# Patient Record
Sex: FEMALE | Race: WHITE | NOT HISPANIC OR LATINO | Employment: FULL TIME | URBAN - METROPOLITAN AREA
[De-identification: names, ages, dates, MRNs, and addresses within clinical notes are randomized per-mention and may not be internally consistent; named-entity substitution may affect disease eponyms.]

---

## 2019-04-13 ENCOUNTER — HOSPITAL ENCOUNTER (EMERGENCY)
Facility: HOSPITAL | Age: 44
Discharge: HOME | End: 2019-04-13
Attending: EMERGENCY MEDICINE | Admitting: EMERGENCY MEDICINE
Payer: COMMERCIAL

## 2019-04-13 ENCOUNTER — APPOINTMENT (EMERGENCY)
Dept: ULTRASOUND IMAGING | Facility: HOSPITAL | Age: 44
End: 2019-04-13
Payer: COMMERCIAL

## 2019-04-13 VITALS
RESPIRATION RATE: 18 BRPM | HEART RATE: 71 BPM | WEIGHT: 231.48 LBS | SYSTOLIC BLOOD PRESSURE: 120 MMHG | BODY MASS INDEX: 35.72 KG/M2 | OXYGEN SATURATION: 100 % | TEMPERATURE: 98.9 F | DIASTOLIC BLOOD PRESSURE: 70 MMHG

## 2019-04-13 DIAGNOSIS — I82.441 ACUTE DEEP VEIN THROMBOSIS (DVT) OF RIGHT TIBIAL VEIN (HCC): Primary | ICD-10-CM

## 2019-04-13 PROCEDURE — 93970 EXTREMITY STUDY: CPT

## 2019-04-13 PROCEDURE — 99283 EMERGENCY DEPT VISIT LOW MDM: CPT

## 2019-04-13 PROCEDURE — 99284 EMERGENCY DEPT VISIT MOD MDM: CPT | Performed by: EMERGENCY MEDICINE

## 2019-04-13 RX ADMIN — APIXABAN 10 MG: 5 TABLET, FILM COATED ORAL at 15:32

## 2019-04-14 PROCEDURE — 93970 EXTREMITY STUDY: CPT | Performed by: SURGERY

## 2019-04-21 ENCOUNTER — HOSPITAL ENCOUNTER (EMERGENCY)
Facility: HOSPITAL | Age: 44
Discharge: HOME | End: 2019-04-22
Attending: EMERGENCY MEDICINE
Payer: COMMERCIAL

## 2019-04-21 DIAGNOSIS — R06.00 DYSPNEA: Primary | ICD-10-CM

## 2019-04-21 PROCEDURE — 99284 EMERGENCY DEPT VISIT MOD MDM: CPT | Performed by: EMERGENCY MEDICINE

## 2019-04-21 PROCEDURE — 99285 EMERGENCY DEPT VISIT HI MDM: CPT

## 2019-04-22 ENCOUNTER — APPOINTMENT (EMERGENCY)
Dept: CT IMAGING | Facility: HOSPITAL | Age: 44
End: 2019-04-22
Payer: COMMERCIAL

## 2019-04-22 VITALS
HEART RATE: 76 BPM | WEIGHT: 232.81 LBS | SYSTOLIC BLOOD PRESSURE: 128 MMHG | DIASTOLIC BLOOD PRESSURE: 69 MMHG | TEMPERATURE: 98.1 F | RESPIRATION RATE: 18 BRPM | BODY MASS INDEX: 35.92 KG/M2 | OXYGEN SATURATION: 96 %

## 2019-04-22 LAB
ALBUMIN SERPL BCP-MCNC: 3.8 G/DL (ref 3.5–5)
ALP SERPL-CCNC: 47 U/L (ref 46–116)
ALT SERPL W P-5'-P-CCNC: 18 U/L (ref 12–78)
ANION GAP SERPL CALCULATED.3IONS-SCNC: 11 MMOL/L (ref 4–13)
APTT PPP: 31 SECONDS (ref 26–38)
AST SERPL W P-5'-P-CCNC: 14 U/L (ref 5–45)
ATRIAL RATE: 79 BPM
BASOPHILS # BLD AUTO: 0.04 THOUSANDS/ΜL (ref 0–0.1)
BASOPHILS NFR BLD AUTO: 1 % (ref 0–1)
BILIRUB SERPL-MCNC: 0.2 MG/DL (ref 0.2–1)
BUN SERPL-MCNC: 14 MG/DL (ref 5–25)
CALCIUM SERPL-MCNC: 8.8 MG/DL (ref 8.3–10.1)
CHLORIDE SERPL-SCNC: 101 MMOL/L (ref 100–108)
CO2 SERPL-SCNC: 25 MMOL/L (ref 21–32)
CREAT SERPL-MCNC: 0.96 MG/DL (ref 0.6–1.3)
EOSINOPHIL # BLD AUTO: 0.22 THOUSAND/ΜL (ref 0–0.61)
EOSINOPHIL NFR BLD AUTO: 4 % (ref 0–6)
ERYTHROCYTE [DISTWIDTH] IN BLOOD BY AUTOMATED COUNT: 14.2 % (ref 11.6–15.1)
GFR SERPL CREATININE-BSD FRML MDRD: 73 ML/MIN/1.73SQ M
GLUCOSE SERPL-MCNC: 143 MG/DL (ref 65–140)
HCT VFR BLD AUTO: 35.9 % (ref 34.8–46.1)
HGB BLD-MCNC: 11.6 G/DL (ref 11.5–15.4)
IMM GRANULOCYTES # BLD AUTO: 0.01 THOUSAND/UL (ref 0–0.2)
IMM GRANULOCYTES NFR BLD AUTO: 0 % (ref 0–2)
INR PPP: 1.07 (ref 0.86–1.17)
LYMPHOCYTES # BLD AUTO: 2.29 THOUSANDS/ΜL (ref 0.6–4.47)
LYMPHOCYTES NFR BLD AUTO: 38 % (ref 14–44)
MCH RBC QN AUTO: 26.2 PG (ref 26.8–34.3)
MCHC RBC AUTO-ENTMCNC: 32.3 G/DL (ref 31.4–37.4)
MCV RBC AUTO: 81 FL (ref 82–98)
MONOCYTES # BLD AUTO: 0.43 THOUSAND/ΜL (ref 0.17–1.22)
MONOCYTES NFR BLD AUTO: 7 % (ref 4–12)
NEUTROPHILS # BLD AUTO: 3.08 THOUSANDS/ΜL (ref 1.85–7.62)
NEUTS SEG NFR BLD AUTO: 50 % (ref 43–75)
NRBC BLD AUTO-RTO: 0 /100 WBCS
P AXIS: 46 DEGREES
PLATELET # BLD AUTO: 223 THOUSANDS/UL (ref 149–390)
PMV BLD AUTO: 9.2 FL (ref 8.9–12.7)
POTASSIUM SERPL-SCNC: 3.4 MMOL/L (ref 3.5–5.3)
PR INTERVAL: 128 MS
PROT SERPL-MCNC: 7.4 G/DL (ref 6.4–8.2)
PROTHROMBIN TIME: 13.6 SECONDS (ref 11.8–14.2)
QRS AXIS: -28 DEGREES
QRSD INTERVAL: 98 MS
QT INTERVAL: 360 MS
QTC INTERVAL: 412 MS
RBC # BLD AUTO: 4.43 MILLION/UL (ref 3.81–5.12)
SODIUM SERPL-SCNC: 137 MMOL/L (ref 136–145)
T WAVE AXIS: 50 DEGREES
TROPONIN I SERPL-MCNC: <0.02 NG/ML
VENTRICULAR RATE: 79 BPM
WBC # BLD AUTO: 6.07 THOUSAND/UL (ref 4.31–10.16)

## 2019-04-22 PROCEDURE — 36415 COLL VENOUS BLD VENIPUNCTURE: CPT | Performed by: EMERGENCY MEDICINE

## 2019-04-22 PROCEDURE — 85730 THROMBOPLASTIN TIME PARTIAL: CPT | Performed by: EMERGENCY MEDICINE

## 2019-04-22 PROCEDURE — 71275 CT ANGIOGRAPHY CHEST: CPT

## 2019-04-22 PROCEDURE — 84484 ASSAY OF TROPONIN QUANT: CPT | Performed by: EMERGENCY MEDICINE

## 2019-04-22 PROCEDURE — 85025 COMPLETE CBC W/AUTO DIFF WBC: CPT | Performed by: EMERGENCY MEDICINE

## 2019-04-22 PROCEDURE — 93010 ELECTROCARDIOGRAM REPORT: CPT | Performed by: INTERNAL MEDICINE

## 2019-04-22 PROCEDURE — 93005 ELECTROCARDIOGRAM TRACING: CPT

## 2019-04-22 PROCEDURE — 80053 COMPREHEN METABOLIC PANEL: CPT | Performed by: EMERGENCY MEDICINE

## 2019-04-22 PROCEDURE — 85610 PROTHROMBIN TIME: CPT | Performed by: EMERGENCY MEDICINE

## 2019-04-22 RX ADMIN — IOHEXOL 85 ML: 350 INJECTION, SOLUTION INTRAVENOUS at 01:03

## 2019-05-29 ENCOUNTER — CONSULT (OUTPATIENT)
Dept: HEMATOLOGY ONCOLOGY | Facility: CLINIC | Age: 44
End: 2019-05-29
Payer: COMMERCIAL

## 2019-05-29 VITALS
TEMPERATURE: 98.4 F | HEART RATE: 74 BPM | DIASTOLIC BLOOD PRESSURE: 88 MMHG | WEIGHT: 224 LBS | BODY MASS INDEX: 34.57 KG/M2 | SYSTOLIC BLOOD PRESSURE: 144 MMHG | OXYGEN SATURATION: 99 % | RESPIRATION RATE: 18 BRPM

## 2019-05-29 DIAGNOSIS — I82.441 ACUTE DEEP VEIN THROMBOSIS (DVT) OF RIGHT TIBIAL VEIN (HCC): ICD-10-CM

## 2019-05-29 DIAGNOSIS — I82.441 ACUTE DEEP VEIN THROMBOSIS (DVT) OF TIBIAL VEIN OF RIGHT LOWER EXTREMITY (HCC): Primary | ICD-10-CM

## 2019-05-29 DIAGNOSIS — Z86.718 HISTORY OF DVT (DEEP VEIN THROMBOSIS): ICD-10-CM

## 2019-05-29 PROCEDURE — 99243 OFF/OP CNSLTJ NEW/EST LOW 30: CPT | Performed by: INTERNAL MEDICINE

## 2019-07-06 ENCOUNTER — APPOINTMENT (OUTPATIENT)
Dept: LAB | Age: 44
End: 2019-07-06
Payer: COMMERCIAL

## 2019-07-06 DIAGNOSIS — I82.441 ACUTE DEEP VEIN THROMBOSIS (DVT) OF TIBIAL VEIN OF RIGHT LOWER EXTREMITY (HCC): ICD-10-CM

## 2019-07-06 LAB
DEPRECATED AT III PPP: 90 % OF NORMAL (ref 92–136)
DEPRECATED D DIMER PPP: 518 NG/ML (FEU)

## 2019-07-06 PROCEDURE — 85379 FIBRIN DEGRADATION QUANT: CPT

## 2019-07-06 PROCEDURE — 85303 CLOT INHIBIT PROT C ACTIVITY: CPT

## 2019-07-06 PROCEDURE — 85670 THROMBIN TIME PLASMA: CPT

## 2019-07-06 PROCEDURE — 85732 THROMBOPLASTIN TIME PARTIAL: CPT

## 2019-07-06 PROCEDURE — 85300 ANTITHROMBIN III ACTIVITY: CPT

## 2019-07-06 PROCEDURE — 85306 CLOT INHIBIT PROT S FREE: CPT

## 2019-07-06 PROCEDURE — 36415 COLL VENOUS BLD VENIPUNCTURE: CPT

## 2019-07-06 PROCEDURE — 81240 F2 GENE: CPT

## 2019-07-06 PROCEDURE — 81241 F5 GENE: CPT

## 2019-07-06 PROCEDURE — 85613 RUSSELL VIPER VENOM DILUTED: CPT

## 2019-07-06 PROCEDURE — 86147 CARDIOLIPIN ANTIBODY EA IG: CPT

## 2019-07-06 PROCEDURE — 85705 THROMBOPLASTIN INHIBITION: CPT

## 2019-07-06 PROCEDURE — 86146 BETA-2 GLYCOPROTEIN ANTIBODY: CPT

## 2019-07-06 PROCEDURE — 85305 CLOT INHIBIT PROT S TOTAL: CPT

## 2019-07-08 LAB
CARDIOLIPIN IGA SER IA-ACNC: <9 APL U/ML (ref 0–11)
CARDIOLIPIN IGG SER IA-ACNC: <9 GPL U/ML (ref 0–14)
CARDIOLIPIN IGM SER IA-ACNC: <9 MPL U/ML (ref 0–12)
PROT C AG ACT/NOR PPP IA: 103 % OF NORMAL (ref 60–150)

## 2019-07-09 ENCOUNTER — HOSPITAL ENCOUNTER (OUTPATIENT)
Dept: NON INVASIVE DIAGNOSTICS | Facility: CLINIC | Age: 44
Discharge: HOME | End: 2019-07-09
Payer: COMMERCIAL

## 2019-07-09 DIAGNOSIS — I82.441 ACUTE DEEP VEIN THROMBOSIS (DVT) OF TIBIAL VEIN OF RIGHT LOWER EXTREMITY (HCC): ICD-10-CM

## 2019-07-09 LAB
B2 GLYCOPROT1 IGA SER-ACNC: <9 GPI IGA UNITS (ref 0–25)
B2 GLYCOPROT1 IGG SER-ACNC: <9 GPI IGG UNITS (ref 0–20)
B2 GLYCOPROT1 IGM SER-ACNC: <9 GPI IGM UNITS (ref 0–32)
F5 GENE MUT ANL BLD/T: NORMAL

## 2019-07-09 PROCEDURE — 93971 EXTREMITY STUDY: CPT | Performed by: SURGERY

## 2019-07-09 PROCEDURE — 93971 EXTREMITY STUDY: CPT

## 2019-07-10 ENCOUNTER — TELEPHONE (OUTPATIENT)
Dept: HEMATOLOGY ONCOLOGY | Facility: CLINIC | Age: 44
End: 2019-07-10

## 2019-07-10 LAB
APTT SCREEN TO CONFIRM RATIO: 1.1 RATIO (ref 0–1.4)
CONFIRM APTT/NORMAL: 39.2 SEC (ref 0–55)
LA PPP-IMP: NORMAL
PROT S ACT/NOR PPP: 63 % (ref 57–157)
PROT S ACT/NOR PPP: 64 % (ref 63–140)
PROT S PPP-ACNC: 76 % (ref 60–150)
SCREEN APTT: 32.8 SEC (ref 0–51.9)
SCREEN DRVVT: 36.9 SEC (ref 0–47)
THROMBIN TIME: 18.6 SEC (ref 0–23)

## 2019-07-10 NOTE — TELEPHONE ENCOUNTER
Left message for patient about change to her appt  Moved from [facility] to [place], [place]  Moved time from 9am to 9:30am on Tuesday 7/16

## 2019-07-12 LAB — F2 GENE MUT ANL BLD/T: NORMAL

## 2019-07-16 ENCOUNTER — OFFICE VISIT (OUTPATIENT)
Dept: HEMATOLOGY ONCOLOGY | Facility: CLINIC | Age: 44
End: 2019-07-16
Payer: COMMERCIAL

## 2019-07-16 VITALS
HEART RATE: 74 BPM | TEMPERATURE: 98.1 F | RESPIRATION RATE: 18 BRPM | BODY MASS INDEX: 33.8 KG/M2 | WEIGHT: 223 LBS | HEIGHT: 68 IN | DIASTOLIC BLOOD PRESSURE: 80 MMHG | OXYGEN SATURATION: 98 % | SYSTOLIC BLOOD PRESSURE: 135 MMHG

## 2019-07-16 DIAGNOSIS — Z79.01 ANTICOAGULANT LONG-TERM USE: ICD-10-CM

## 2019-07-16 DIAGNOSIS — O22.30 DVT (DEEP VEIN THROMBOSIS) IN PREGNANCY: ICD-10-CM

## 2019-07-16 DIAGNOSIS — I82.4Z1 ACUTE DEEP VEIN THROMBOSIS (DVT) OF DISTAL VEIN OF RIGHT LOWER EXTREMITY (HCC): Primary | ICD-10-CM

## 2019-07-16 PROBLEM — I82.401 DEEP VEIN THROMBOSIS (DVT) OF RIGHT LOWER EXTREMITY (HCC): Status: ACTIVE | Noted: 2019-07-16

## 2019-07-16 PROCEDURE — 99214 OFFICE O/P EST MOD 30 MIN: CPT | Performed by: PHYSICIAN ASSISTANT

## 2019-07-16 RX ORDER — ALBUTEROL SULFATE 90 UG/1
AEROSOL, METERED RESPIRATORY (INHALATION)
COMMUNITY
Start: 2019-02-27

## 2019-07-16 NOTE — PROGRESS NOTES
Hematology/Oncology Outpatient Follow- up Note  Maria Luisa Knight 38 y o  female MRN: @ Encounter: [id number]      Date:  7/16/2019    Presenting Complaint/Diagnosis :  DVT    HPI:  Enrrique Diaz is a pleasant 38year old  female, who presents to the office unaccompanied for today's visit  She has history of DVT 7 years ago, which was provoked by pregnancy  She had recent 2nd DVT in 4/19, which was unprovoked, and was placed on Anticoagulation  She denied excess travel, trauma, surgery, is a nonsmoker, had no history of malignancy, and no chronic inflammation or autoimmune disorder with no FH thrombosis  She noted prolonged drive, but that has been ongoing for 3+ years  She has been on Eliquis for 3 months, taking treatment dose 5 mg BID  She has tolerated it well, with noting slight increase in bleeding with cutting herself, but otherwise has no bleeding from any orifice  She has not noted increased bruising  Interval History:    Since initial consult with Dr Maximus Byrnes on 5/29/19, patient notes she is doing well  She notes no real side effects from Eliquis and no increase bruising  She notes if she nicks herself when shaving, it may take slightly longer to stop, but that is it  She denies bleeding from any orifice  She notes her menses is regular and has not increased or changed  She notes occasional palpitations, and that has been ongoing every 6 months or so and PCP aware  Denies CP/SOB, N/V/D/C  Test Results:  Imaging: Vas Lower Limb Venous Duplex Study, Unilateral/limited  Result Date: 7/9/2019  Narrative:  [facility]PORT [facility]INICAL: Indications: Patient presents to determine propagation vs resolution of previously noted DVT in the right posterior tibial veins from the mid to proximal calf discovered on 04/13/2019  Patient reports no complaints at this time  Operative History: 2019-07-09 No heart or vascular surgeries Risk Factors The patient has history of DVT, and on Eliquis  She has no history of Obesity  The patient current BMI is 35 08, Weight is 224 lb and height is 67 in  FINDINGS:  Right       Impression                           Valve   Reflux Time  CFV                                              Reflux      1 20000  Peroneal    E3  Occlusive Segmental (Chronic)                          PostTibial  E1  Non Occlusive Thrombus (Chronic)                        Left        Impression       CFV         Normal (Patent)     CONCLUSION:  Impression: RIGHT LOWER LIMB No evidence of acute deep vein thrombosis  Chronic non occlusive deep vein thrombosis in the posterior tibial veins at the proximal calf extending into the proximal calf  Subacute versus chronic non occlusive deep vein thrombosis in one peroneal vein from at the mid calf extending into the proximal calf  No evidence of superficial thrombophlebitis noted  Deep venous incompetence is noted  Popliteal, posterior tibial and anterior tibial arterial Doppler waveforms are triphasic  LEFT LOWER LIMB LIMITED Evaluation shows no evidence of thrombus in the common femoral vein  Doppler evaluation shows a normal response to augmentation maneuvers  In comparison to the study of 04/13/2019, there is partial resolution of the prior occlusive posterior tibial vein DVT  There is now subacute to chronic DVT noted in one peroneal vein  SIGNATURE: Electronically Signed by: Neo Luna on 2019-07-09 10:50:46 AM    Labs: MOST RECENT CBC & CMP  Lab Results   Component Value Date    WBC 6 07 04/22/2019    HGB 11 6 04/22/2019    HCT 35 9 04/22/2019    MCV 81 (L) 04/22/2019     04/22/2019   Stable      Lab Results   Component Value Date    K 3 4 (L) 04/22/2019     04/22/2019    CO2 25 04/22/2019    BUN 14 04/22/2019    CREATININE 0 96 04/22/2019    CALCIUM 8 8 04/22/2019    AST 14 04/22/2019    ALT 18 04/22/2019    ALKPHOS 47 04/22/2019    EGFR 73 04/22/2019     D-dimer mildly elevated at 518, antithrombin III mildly decreased at 90%    60% being clinical cutoff  Anticardiolipin WNL, factor V leiden (-)  Lupus anticoagulant also WNL, as is Protein C & S  Review of Systems   Constitutional: Negative for activity change, appetite change, fatigue, fever and unexpected weight change  HENT: Negative for congestion, nosebleeds, sore throat and trouble swallowing  Eyes: Negative for visual disturbance  Wears glasses  Respiratory: Negative for cough, chest tightness, shortness of breath and wheezing  Cardiovascular: Positive for palpitations (Occasional   Every 6 months or so )  Negative for chest pain and leg swelling  Gastrointestinal: Negative for abdominal distention, abdominal pain, blood in stool, constipation, diarrhea, nausea and vomiting  Genitourinary: Positive for vaginal bleeding (Menstrual cycle  Regular  No change cycle)  Negative for difficulty urinating, dysuria, hematuria and pelvic pain  Musculoskeletal: Negative for arthralgias, back pain and myalgias  Right calf still sore due to pressure from U/S  Improving  Skin: Negative for pallor and rash  Neurological: Negative for dizziness, weakness, numbness and headaches  Hematological: Negative for adenopathy  Does not bruise/bleed easily (No)  Psychiatric/Behavioral: Negative for confusion and sleep disturbance  The patient is not nervous/anxious        Active Problems:   Patient Active Problem List   Diagnosis    Family history of malignant neoplasm of breast    Diabetes mellitus affecting pregnancy, childbirth, or puerperium    Eczema    DVT [facility]deep vein thrombosis) in pregnancy ([facility]    Deep vein thrombosis (DVT) of right lower extremity (HCC)    Anticoagulant long-term use     Past Medical History:   Past Medical History:   Diagnosis Date    D[facility]deep venous thrombosis) ([facility]      Surgical History:   Past Surgical History:   Procedure Laterality Date     SECTION      OVARIAN CYST REMOVAL       Family History:  No family history on file  Cancer-related family history is not on file  Social History:   Social History     Socioeconomic History    Marital status: /Civil Union     Spouse name: Not on file    Number of children: Not on file    Years of education: Not on file    Highest education level: Not on file   Occupational History    Not on file   Social Needs    Financial resource strain: Not on file    Food insecurity:     Worry: Not on file     Inability: Not on file    Transportation needs:     Medical: Not on file     Non-medical: Not on file   Tobacco Use    Smoking status: Never Smoker    Smokeless tobacco: Never Used   Substance and Sexual Activity    Alcohol use: Not Currently    Drug use: Never    Sexual activity: Not on file   Lifestyle    Physical activity:     Days per week: Not on file     Minutes per session: Not on file    Stress: Not on file   Relationships    Social connections:     Talks on phone: Not on file     Gets together: Not on file     Attends [employer] service: Not on file     Active member of club or organization: Not on file     Attends meetings of clubs or organizations: Not on file     Relationship status: Not on file    Intimate partner violence:     Fear of current or ex partner: Not on file     Emotionally abused: Not on file     Physically abused: Not on file     Forced sexual activity: Not on file   Other Topics Concern    Not on file   Social History Narrative    Not on file     Current Medications:   Current Outpatient Medications   Medication Sig Dispense Refill    albuterol (PROAIR HFA) 90 mcg/act inhaler   RESCUE INHALER: 1-2inhalations every 4hrs as needed for cough, wheezing or shortness of breath      apixaban (ELIQUIS) 2 5 mg Take 1 tablet (2 5 mg total) by mouth 2 (two) times a day 180 tablet 3     No current facility-administered medications for this visit        Allergies: No Known Allergies    Physical Exam:  Physical Exam   Constitutional: She is oriented to person, place, and time  She appears well-developed and well-nourished  No distress  HENT:   Head: Normocephalic and atraumatic  Mouth/Throat: Oropharynx is clear and moist  No oropharyngeal exudate  Eyes: Pupils are equal, round, and reactive to light  Conjunctivae and EOM are normal  Right eye exhibits no discharge  Left eye exhibits no discharge  No scleral icterus  Positive glasses  Neck: Normal range of motion  Neck supple  No tracheal deviation present  No thyromegaly present  Cardiovascular: Normal rate, regular rhythm and normal heart sounds  Exam reveals no gallop and no friction rub  No murmur heard  Pulmonary/Chest: Effort normal and breath sounds normal  No respiratory distress  She has no wheezes  She has no rales  Abdominal: Soft  Bowel sounds are normal  She exhibits no distension and no mass  There is no hepatosplenomegaly  There is no tenderness  There is no rebound and no guarding  Genitourinary:   Genitourinary Comments: Deferred     Musculoskeletal: Normal range of motion  She exhibits no edema or tenderness  Lymphadenopathy:     She has no cervical adenopathy  Right: No inguinal and no supraclavicular adenopathy present  Left: No inguinal and no supraclavicular adenopathy present  Neurological: She is alert and oriented to person, place, and time  Skin: Skin is warm and dry  No rash noted  She is not diaphoretic  No erythema  No pallor  Psychiatric: She has a normal mood and affect  Her behavior is normal      Vitals:    07/16/19 1000   BP: 135/80   Pulse: 74   Resp: 18   Temp: 98 1 °F (36 7 °C)   SpO2: 98%   Stable  Assessment / Plan:    1  Acute deep vein thrombosis (DVT) of distal vein of right lower extremity ([address] )  2  DVT (deep vein thrombosis) in pregnancy ([address] )  3  Anticoagulant long-term use  DVT on anticoagulation currently with Eliquis 5 mg BID    Patient initially had DVT in pregnancy 7 years ago, and in 4/19, she developed another unprovoked DVT  She notes she tolerates med quite well  Dr Daniel Ingram was present and explained to patient that risk of clot is 1-2%, and risk of bleeding is 0 3%, tipping the scale more toward risk of clot formation  As risk 5-10% of developing another clot, as D dimer is mildly elevated, and Antithrombin III is mildly decreased, we will plan on staying on maintenance dose of Eliquis at 2 5 mg BID  We reviewed her Doppler, and this represents her new baseline  There was Right chronic nonocclusive DVT with Left OK  This may represent scar vs chronic clot, but unable to determine  We will plan on continuing with Eliquis for the next year  We will repeat D dimer in 1 year and based on result, we may switch to baby ASA daily  She was made aware to contact our office with any bleeding concerns, and if bad to report to ED  She is aware to avoid head trauma, and will proceed to ED with significant injury  We provided refill for new dose of Eliquis  - D-dimer, quantitative; Future  - apixaban (ELIQUIS) 2 5 mg; Take 1 tablet (2 5 mg total) by mouth 2 (two) times a day  Dispense: 180 tablet; Refill: 3      ECOG PS 0    Goals and Barriers:  Current Goal:  Prolong Survival from DVT  Barriers: None  Patient's Capacity to Self Care:  Patient is able to self care  Total time spent with patient was 30 minutes, of which >50% of the time was spent in direct consultation discussing DVT, management and reviewing results  Dr Daniel Ingram joined in room for visit  Portions of the record may have been created with voice recognition software   Occasional wrong word or "sound a like" substitutions may have occurred due to the inherent limitations of voice recognition software   Read the chart carefully and recognize, using context, where substitutions have occurred

## 2020-02-03 ENCOUNTER — TELEPHONE (OUTPATIENT)
Dept: INFUSION CENTER | Facility: HOSPITAL | Age: 45
End: 2020-02-03

## 2020-02-03 NOTE — TELEPHONE ENCOUNTER
Patient inquiring whether she can take glucosamine for joint pain while on eliquis  Glucosamine can thin the blood so patient advised not to take  Patient also has allergies and wants to take allegra -D, no interaction seen with eliquis

## 2020-05-22 ENCOUNTER — TELEPHONE (OUTPATIENT)
Dept: HEMATOLOGY ONCOLOGY | Facility: CLINIC | Age: 45
End: 2020-05-22

## 2020-06-17 ENCOUNTER — TELEPHONE (OUTPATIENT)
Dept: HEMATOLOGY ONCOLOGY | Facility: MEDICAL CENTER | Age: 45
End: 2020-06-17

## 2020-07-28 ENCOUNTER — TRANSCRIBE ORDERS (OUTPATIENT)
Dept: LAB | Facility: CLINIC | Age: 45
End: 2020-07-28

## 2020-07-28 ENCOUNTER — TELEPHONE (OUTPATIENT)
Dept: HEMATOLOGY ONCOLOGY | Facility: CLINIC | Age: 45
End: 2020-07-28

## 2020-07-28 DIAGNOSIS — Z79.01 ANTICOAGULANT LONG-TERM USE: Primary | ICD-10-CM

## 2020-07-28 NOTE — TELEPHONE ENCOUNTER
Patient has appt with Dr Monge Doctor on 8/4/20  She went to the lab yesterday, but no lab work was put in the system  Please add her labs so she can go tomorrow  Thank you

## 2020-07-29 ENCOUNTER — APPOINTMENT (OUTPATIENT)
Dept: LAB | Facility: CLINIC | Age: 45
End: 2020-07-29
Payer: COMMERCIAL

## 2020-07-29 DIAGNOSIS — Z79.01 ANTICOAGULANT LONG-TERM USE: ICD-10-CM

## 2020-07-29 LAB
BASOPHILS # BLD AUTO: 0.04 THOUSANDS/ΜL (ref 0–0.1)
BASOPHILS NFR BLD AUTO: 1 % (ref 0–1)
D DIMER PPP FEU-MCNC: 0.32 UG/ML FEU
EOSINOPHIL # BLD AUTO: 0.24 THOUSAND/ΜL (ref 0–0.61)
EOSINOPHIL NFR BLD AUTO: 4 % (ref 0–6)
ERYTHROCYTE [DISTWIDTH] IN BLOOD BY AUTOMATED COUNT: 13.8 % (ref 11.6–15.1)
HCT VFR BLD AUTO: 36.3 % (ref 34.8–46.1)
HGB BLD-MCNC: 11.4 G/DL (ref 11.5–15.4)
IMM GRANULOCYTES # BLD AUTO: 0.03 THOUSAND/UL (ref 0–0.2)
IMM GRANULOCYTES NFR BLD AUTO: 0 % (ref 0–2)
LYMPHOCYTES # BLD AUTO: 2.07 THOUSANDS/ΜL (ref 0.6–4.47)
LYMPHOCYTES NFR BLD AUTO: 30 % (ref 14–44)
MCH RBC QN AUTO: 26.4 PG (ref 26.8–34.3)
MCHC RBC AUTO-ENTMCNC: 31.4 G/DL (ref 31.4–37.4)
MCV RBC AUTO: 84 FL (ref 82–98)
MONOCYTES # BLD AUTO: 0.48 THOUSAND/ΜL (ref 0.17–1.22)
MONOCYTES NFR BLD AUTO: 7 % (ref 4–12)
NEUTROPHILS # BLD AUTO: 4.05 THOUSANDS/ΜL (ref 1.85–7.62)
NEUTS SEG NFR BLD AUTO: 58 % (ref 43–75)
NRBC BLD AUTO-RTO: 0 /100 WBCS
PLATELET # BLD AUTO: 183 THOUSANDS/UL (ref 149–390)
PMV BLD AUTO: 8.6 FL (ref 8.9–12.7)
RBC # BLD AUTO: 4.32 MILLION/UL (ref 3.81–5.12)
WBC # BLD AUTO: 6.91 THOUSAND/UL (ref 4.31–10.16)

## 2020-07-29 PROCEDURE — 85379 FIBRIN DEGRADATION QUANT: CPT

## 2020-07-29 PROCEDURE — 36415 COLL VENOUS BLD VENIPUNCTURE: CPT

## 2020-07-29 PROCEDURE — 85025 COMPLETE CBC W/AUTO DIFF WBC: CPT

## 2020-08-04 ENCOUNTER — OFFICE VISIT (OUTPATIENT)
Dept: HEMATOLOGY ONCOLOGY | Facility: CLINIC | Age: 45
End: 2020-08-04
Payer: COMMERCIAL

## 2020-08-04 VITALS
WEIGHT: 247.5 LBS | HEART RATE: 85 BPM | DIASTOLIC BLOOD PRESSURE: 88 MMHG | OXYGEN SATURATION: 98 % | BODY MASS INDEX: 37.63 KG/M2 | RESPIRATION RATE: 18 BRPM | SYSTOLIC BLOOD PRESSURE: 128 MMHG

## 2020-08-04 DIAGNOSIS — I82.4Z1 ACUTE DEEP VEIN THROMBOSIS (DVT) OF DISTAL VEIN OF RIGHT LOWER EXTREMITY (HCC): ICD-10-CM

## 2020-08-04 DIAGNOSIS — Z86.718 HISTORY OF DVT (DEEP VEIN THROMBOSIS): Primary | ICD-10-CM

## 2020-08-04 PROCEDURE — 99214 OFFICE O/P EST MOD 30 MIN: CPT | Performed by: PHYSICIAN ASSISTANT

## 2020-08-04 NOTE — PROGRESS NOTES
Medical Oncology/Hematology Outpatient Follow Up Note  Roda Eisenmenger, 41 y o , 1975      Date:  20    Primary Hematology/Oncology Provider: Dr Musa Thompson      Presenting Diagnosis/Chief Complaint:   Chief Complaint   Patient presents with    Follow-up     DVT       HPI/Oncologic/Hematologic History:  Oncology History    No history exists  History of DVT  provoked by preganancy  Second DVT 2019, unprovoked  Initiated on Eliquis 5 mg po BID  Interval history:  20:   Today,no complaints  Feels very well  Review of Systems   Constitutional: Negative for chills, fatigue and fever  HENT: Negative for nosebleeds and sore throat  Eyes: Negative for visual disturbance  Respiratory: Negative for cough and shortness of breath  Gastrointestinal: Negative for abdominal pain, nausea and vomiting  Genitourinary: Negative for dysuria  Musculoskeletal: Negative for arthralgias  Skin: Negative for rash  Neurological: Negative for headaches  Hematological: Negative for adenopathy  Does not bruise/bleed easily  Psychiatric/Behavioral: Negative for confusion  All other systems reviewed and are negative  All other review of systems were negative  Medical History:   has a past medical history of DVT (deep venous thrombosis) ([facility] )  Surgical History:   has a past surgical history that includes  section and Ovarian cyst removal     Social History:   reports that she has never smoked  She has never used smokeless tobacco  She reports previous alcohol use  She reports that she does not use drugs  Family History:  family history is not on file  Allergies:   [unfilled]    Current Medications:     Current Outpatient Medications:     albuterol (PROAIR HFA) 90 mcg/act inhaler,    RESCUE INHALER: 1-2inhalations every 4hrs as needed for cough, wheezing or shortness of breath, Disp: , Rfl:     apixaban (ELIQUIS) 2 5 mg, Take 1 tablet (2 5 mg total) by mouth 2 (two) times a day, Disp: 180 tablet, Rfl: 3      Vital Signs:  Vitals:    08/04/20 0800   BP: 128/88   Pulse: 85   Resp: 18   SpO2: 98%       Physical Exam:  Physical Exam   HENT:   Head: Normocephalic and atraumatic  Eyes: No scleral icterus  Cardiovascular: Normal rate and regular rhythm  Pulmonary/Chest: Effort normal and breath sounds normal  No respiratory distress  She has no wheezes  Abdominal: Normal appearance  She exhibits no distension  Musculoskeletal: Normal range of motion  Right lower leg: No edema  Left lower leg: No edema  Skin: Skin is warm and dry  No pallor  Vitals reviewed  Imaging Studies:  Results for orders placed during the hospital encounter of 04/21/19   CTA ED CHEST PE STUDY (Final) 4/22/2019  1:33 AM    Impression 1  No evidence of pulmonary embolism  2   Cholelithiasis  Workstation performed: [id number]       Signed by: Rosemary Cooper MD       Laboratory Studies:  Lab Results   Component Value Date    WBC 6 91 07/29/2020    HGB 11 4 (L) 07/29/2020    HCT 36 3 07/29/2020    MCV 84 07/29/2020     07/29/2020     Lab Results   Component Value Date    K 3 4 (L) 04/22/2019     04/22/2019    CO2 25 04/22/2019    BUN 14 04/22/2019    CREATININE 0 96 04/22/2019    CALCIUM 8 8 04/22/2019    AST 14 04/22/2019    ALT 18 04/22/2019    ALKPHOS 47 04/22/2019    EGFR 73 04/22/2019       Orders Placed This Encounter   Procedures    CBC and differential     This is a patient instruction: This test is non-fasting  Please drink two glasses of water morning of bloodwork  Standing Status:   Future     Standing Expiration Date:   8/4/2021            Pertinent laboratory and imaging studies reviewed  ASSESSMENT/PLAN:  Adalberto Bentley is a 41 y  o   who presents for continued evaluation and management of   1  History of DVT (deep vein thrombosis)  2   Acute deep vein thrombosis (DVT) of distal vein of right lower extremity (HCC)  Treated for recurrent unprovoked DVT for 1 year  Subsequent normalization of D-Dimer (0 3)  Therefore per Dr Nuria Johnson prior recommendations we will discontinue Eliquis 2 5 mg BID and initiate low dose Aspirin 81 mg po daily  She will contact us if she has any new symptoms, questions or concerns  We will see her back in 1 year for routine hematology follow up  She understands if she develops another unprovoked DVT recommend lifelong therapeutic anticoagulation  Sin Lazo will return in 1 year time for continued management  Sin Lazo verbalized understanding of the plan and was in agreement and will contact us in the interim if there are questions or concerns  Barrier(s) to care identified  None  The patient is  able to self care  Please note: This report has been generated by a voice recognition software system  Therefore there may be syntax, spelling, and/or grammatical errors

## 2020-12-21 ENCOUNTER — OFFICE VISIT (OUTPATIENT)
Dept: GYNECOLOGY | Facility: CLINIC | Age: 45
End: 2020-12-21
Payer: COMMERCIAL

## 2020-12-21 VITALS
HEIGHT: 67 IN | HEART RATE: 90 BPM | DIASTOLIC BLOOD PRESSURE: 80 MMHG | WEIGHT: 253 LBS | SYSTOLIC BLOOD PRESSURE: 116 MMHG | BODY MASS INDEX: 39.71 KG/M2

## 2020-12-21 DIAGNOSIS — Z01.419 ENCOUNTER FOR GYNECOLOGICAL EXAMINATION WITH PAPANICOLAOU SMEAR OF CERVIX: ICD-10-CM

## 2020-12-21 DIAGNOSIS — Z01.419 ENCOUNTER FOR GYNECOLOGICAL EXAMINATION (GENERAL) (ROUTINE) WITHOUT ABNORMAL FINDINGS: Primary | ICD-10-CM

## 2020-12-21 DIAGNOSIS — Z80.0 FAMILY HISTORY OF COLON CANCER REQUIRING SCREENING COLONOSCOPY: ICD-10-CM

## 2020-12-21 PROCEDURE — G0145 SCR C/V CYTO,THINLAYER,RESCR: HCPCS | Performed by: OBSTETRICS & GYNECOLOGY

## 2020-12-21 PROCEDURE — 99386 PREV VISIT NEW AGE 40-64: CPT | Performed by: OBSTETRICS & GYNECOLOGY

## 2020-12-21 PROCEDURE — 87624 HPV HI-RISK TYP POOLED RSLT: CPT | Performed by: OBSTETRICS & GYNECOLOGY

## 2020-12-21 RX ORDER — ASPIRIN 81 MG/1
81 TABLET ORAL DAILY
COMMUNITY
End: 2021-11-10

## 2020-12-22 ENCOUNTER — TELEPHONE (OUTPATIENT)
Dept: GASTROENTEROLOGY | Facility: CLINIC | Age: 45
End: 2020-12-22

## 2020-12-23 LAB
HPV HR 12 DNA CVX QL NAA+PROBE: NEGATIVE
HPV16 DNA CVX QL NAA+PROBE: NEGATIVE
HPV18 DNA CVX QL NAA+PROBE: NEGATIVE

## 2020-12-24 LAB
LAB AP GYN PRIMARY INTERPRETATION: NORMAL
Lab: NORMAL

## 2021-07-19 ENCOUNTER — TELEPHONE (OUTPATIENT)
Dept: HEMATOLOGY ONCOLOGY | Facility: CLINIC | Age: 46
End: 2021-07-19

## 2021-07-19 NOTE — TELEPHONE ENCOUNTER
Patient called to see if she need's labs for appt on 8-4-2021 with Dr Mariela HAJI did confirm labs were needed  Patient understood

## 2021-07-23 ENCOUNTER — TELEPHONE (OUTPATIENT)
Dept: HEMATOLOGY ONCOLOGY | Facility: CLINIC | Age: 46
End: 2021-07-23

## 2021-07-23 DIAGNOSIS — Z86.718 HISTORY OF DVT (DEEP VEIN THROMBOSIS): Primary | ICD-10-CM

## 2021-07-23 NOTE — TELEPHONE ENCOUNTER
Left msg for patient explaining lab was entered for her to have drawn   She should call back with any additional questions

## 2021-07-23 NOTE — TELEPHONE ENCOUNTER
Patient calling in stating that her lab orders have not been placed for follow up with Dr Tyler Matamoros on 08/04/2021   Patient can be reached at [phone] once orders are placed

## 2021-07-23 NOTE — TELEPHONE ENCOUNTER
Will send to [facility] to confirm with Dr Aleksandr Vizcaino what labs are needed prior to appointment   Please let me know and I can enter labs and call patient

## 2021-07-23 NOTE — TELEPHONE ENCOUNTER
CBC was placed originally by Carloz Mcdaniel PA-C  Just a CBC can be placed since Dalia is no longer here

## 2021-07-24 ENCOUNTER — APPOINTMENT (OUTPATIENT)
Dept: LAB | Facility: CLINIC | Age: 46
End: 2021-07-24
Payer: COMMERCIAL

## 2021-07-24 DIAGNOSIS — Z86.718 HISTORY OF DVT (DEEP VEIN THROMBOSIS): ICD-10-CM

## 2021-07-24 LAB
BASOPHILS # BLD AUTO: 0.03 THOUSANDS/ΜL (ref 0–0.1)
BASOPHILS NFR BLD AUTO: 1 % (ref 0–1)
EOSINOPHIL # BLD AUTO: 0.32 THOUSAND/ΜL (ref 0–0.61)
EOSINOPHIL NFR BLD AUTO: 5 % (ref 0–6)
ERYTHROCYTE [DISTWIDTH] IN BLOOD BY AUTOMATED COUNT: 14.5 % (ref 11.6–15.1)
HCT VFR BLD AUTO: 36.9 % (ref 34.8–46.1)
HGB BLD-MCNC: 11.4 G/DL (ref 11.5–15.4)
IMM GRANULOCYTES # BLD AUTO: 0.02 THOUSAND/UL (ref 0–0.2)
IMM GRANULOCYTES NFR BLD AUTO: 0 % (ref 0–2)
LYMPHOCYTES # BLD AUTO: 1.96 THOUSANDS/ΜL (ref 0.6–4.47)
LYMPHOCYTES NFR BLD AUTO: 32 % (ref 14–44)
MCH RBC QN AUTO: 25.7 PG (ref 26.8–34.3)
MCHC RBC AUTO-ENTMCNC: 30.9 G/DL (ref 31.4–37.4)
MCV RBC AUTO: 83 FL (ref 82–98)
MONOCYTES # BLD AUTO: 0.45 THOUSAND/ΜL (ref 0.17–1.22)
MONOCYTES NFR BLD AUTO: 7 % (ref 4–12)
NEUTROPHILS # BLD AUTO: 3.36 THOUSANDS/ΜL (ref 1.85–7.62)
NEUTS SEG NFR BLD AUTO: 55 % (ref 43–75)
NRBC BLD AUTO-RTO: 0 /100 WBCS
PLATELET # BLD AUTO: 230 THOUSANDS/UL (ref 149–390)
PMV BLD AUTO: 8.7 FL (ref 8.9–12.7)
RBC # BLD AUTO: 4.44 MILLION/UL (ref 3.81–5.12)
WBC # BLD AUTO: 6.14 THOUSAND/UL (ref 4.31–10.16)

## 2021-07-24 PROCEDURE — 85025 COMPLETE CBC W/AUTO DIFF WBC: CPT

## 2021-07-24 PROCEDURE — 36415 COLL VENOUS BLD VENIPUNCTURE: CPT

## 2021-08-04 ENCOUNTER — OFFICE VISIT (OUTPATIENT)
Dept: HEMATOLOGY ONCOLOGY | Facility: CLINIC | Age: 46
End: 2021-08-04
Payer: COMMERCIAL

## 2021-08-04 VITALS
TEMPERATURE: 97.5 F | HEIGHT: 67 IN | DIASTOLIC BLOOD PRESSURE: 82 MMHG | HEART RATE: 103 BPM | OXYGEN SATURATION: 98 % | BODY MASS INDEX: 38.92 KG/M2 | RESPIRATION RATE: 18 BRPM | SYSTOLIC BLOOD PRESSURE: 118 MMHG | WEIGHT: 248 LBS

## 2021-08-04 DIAGNOSIS — O22.30 DVT (DEEP VEIN THROMBOSIS) IN PREGNANCY: ICD-10-CM

## 2021-08-04 DIAGNOSIS — I82.4Z1 ACUTE DEEP VEIN THROMBOSIS (DVT) OF DISTAL VEIN OF RIGHT LOWER EXTREMITY (HCC): Primary | ICD-10-CM

## 2021-08-04 PROCEDURE — 99214 OFFICE O/P EST MOD 30 MIN: CPT | Performed by: INTERNAL MEDICINE

## 2021-08-04 NOTE — PROGRESS NOTES
[facility]OLOGY / [address] NOTE    Primary Care Provider: Jm Christensen DO  Referring Provider:    MRN: [id number]  : 1975    Reason for Encounter:    Chief Complaint   Patient presents with    Follow-up     History of DVT         Hematology / Oncology History:     Emma Wilkerson is a 40 y o  female who came in to establish care with hematology    1  Remote left lower extremity DVT 7 years ago  - presented with left leg tightness, diagnosed DVT without PE in [address] [place] [address], in 2nd trimester of 2nd pregnancy  - patient was on Lovenox self injection until postpartum  2, unprovoked DVT in 2019  - presented with leg tightness similar to previous, in right leg  Presented to ED and diagnosed DVT, patient was started on Eliquis  About a week later she presented with short of breath, CT angio is negative for PE, dyspnea is considered due to asthma exacerbation   - 2019:  Repeat Doppler showed chronic DVT of right leg  " No evidence of acute deep vein thrombosis  Chronic non occlusive deep vein thrombosis in the posterior tibial veins at the proximal calf extending into the proximal calf  Subacute versus chronic non occlusive deep vein thrombosis in one peroneal vein from at the mid calf extending into the proximal calf "     -  tolerated Eliquis x 1yr, switch to baby aspirin  - no clear provoking factor identified  Thrombosis panel is essentially negative  Assessment / Plan:      1  Acute deep vein thrombosis (DVT) of distal vein of right lower extremity (HCC)    - has been on baby aspirin, no new leg swelling overall doing very well no issues  Discharge patient from our practice follow patient as needed in the future       2  DVT (deep vein thrombosis) in pregnancy    - as above             25       minutes were spent face to face with patient with greater than 50% of the time spent in counseling or coordination of care including discussions of treatment instructions    All of the patient's questions were answered to their satisfactory during this discussion  Advised pt to call if there is any further questions  Interval History:     5/29/2019:  Patient id a 42-year-old female, with history of DVT, 7 years ago, provoked by pregnancy  Recently developed another episode of DVT, came in to establish care with hematology  She has been on Eliquis for 6 weeks, overall has been doing well no issues  No bleeding  Reported her menstrual bleeding appears prolonged but not have year  No other bleeding  For the past episode diagnosed in 04/2019, again patient confirmed there is no travel trauma surgery, patient nonsmoker, no history of malignancy or urine fibroid, no chronic inflammation autoimmune disease, no family history of thrombosis  She commutes about 1 [address] one way 3 times a week however has been doing this for 3 years now  As above, nonsmoker  8/5/2021:  Patient came for follow-up subjective patient has been doing okay body weight stable no new leg swelling no bleeding on baby aspirin tolerated well no issues       Problem list:       Patient Active Problem List   Diagnosis    Family history of malignant neoplasm of breast    Diabetes mellitus affecting pregnancy, childbirth, or puerperium    Eczema    DVT (deep vein thrombosis) in pregnancy    Deep vein thrombosis (DVT) of right lower extremity (HCC)    Anticoagulant long-term use       PHYSICIAL EXAMINATION:       Vital Signs:   [unfilled]  Body mass index is 38 84 kg/m²  Body surface area is 2 21 meters squared  Overweight, no new leg swelling  No ecchymosis on skin       GEN: Alert, awake oriented x3, in no acute distress  HEENT- No pallor, icterus, cyanosis, no oral mucosal lesions,   LAD - no palpable cervical, clavicle, axillary, inguinal LAD  Heart- normal S1 S2, regular rate and rhythm, No murmur, rubs     Lungs- decreased breathing sound bilateral    Abdomen- soft, Non tender, bowel sounds present  Extremities- No cyanosis, clubbing, edema  Neuro- No focal neurological deficit           PAST MEDICAL HISTORY:   has a past medical history of DVT (deep venous thrombosis) ([address] )  PAST SURGICAL HISTORY:   has a past surgical history that includes  section and Ovarian cyst removal     CURRENT MEDICATIONS:     Current Outpatient Medications   Medication Sig Dispense Refill    albuterol (PROAIR HFA) 90 mcg/act inhaler   RESCUE INHALER: 1-2inhalations every 4hrs as needed for cough, wheezing or shortness of breath      aspirin (ECOTRIN LOW STRENGTH) 81 mg EC tablet Take 81 mg by mouth daily       No current facility-administered medications for this visit  [unfilled]    SOCIAL HISTORY:   reports that she has never smoked  She has never used smokeless tobacco  She reports previous alcohol use  She reports that she does not use drugs  FAMILY HISTORY:  family history includes Breast cancer in her maternal grandmother; Breast cancer (age of onset: 48) in her mother; Colon cancer (age of onset: 64) in her mother; Dementia in her paternal grandmother; Heart attack (age of onset: 47) in her paternal grandfather; Prostate cancer (age of onset: 76) in her father  ALLERGIES:  has No Known Allergies  REVIEW OF SYSTEMS:  Please note that a 14-point review of systems was performed to include Constitutional, HEENT, Respiratory, CVS, GI, , Musculoskeletal, Integumentary, Neurologic, Rheumatologic, Endocrinologic, Psychiatric, Lymphatic, and Hematologic/Oncologic systems were reviewed and are negative unless otherwise stated in HPI  Positive and negative findings pertinent to this evaluation are incorporated into the history of present illness              LAB:    Lab Results   Component Value Date    WBC 6 14 2021    HGB 11 4 (L) 2021    HCT 36 9 2021    MCV 83 2021     2021       Lab Results   Component Value Date    SODIUM 137 2019    K 3 4 (L) 2019  04/22/2019    CO2 25 04/22/2019    AGAP 11 04/22/2019    BUN 14 04/22/2019    CREATININE 0 96 04/22/2019    GLUC 143 (H) 04/22/2019    CALCIUM 8 8 04/22/2019    AST 14 04/22/2019    ALT 18 04/22/2019    ALKPHOS 47 04/22/2019    TP 7 4 04/22/2019    TBILI 0 20 04/22/2019    EGFR 73 04/22/2019       CBC with diff:       Invalid input(s):  RBC, TOTALCELLSCOUNTED, SEGS%, GRANS%, LYMPHS%, EOS%, BASO%, ABNEUT, ABGRANS, ABLYMPHS, ABMOMOS, ABEOS, ABBASO    CMP:      Invalid input(s): ALBUMIN        IMAGING:    No orders to display     No results found

## 2021-09-02 ENCOUNTER — TELEPHONE (OUTPATIENT)
Dept: GYNECOLOGY | Facility: CLINIC | Age: 46
End: 2021-09-02

## 2021-09-02 NOTE — TELEPHONE ENCOUNTER
Enrrique Diaz called and is going on vacation and would like to know if you could give her something to stop her period

## 2021-09-03 NOTE — TELEPHONE ENCOUNTER
I LM on patient's VM - stopping periods in generally done a few months in advance and with the patient's hx of DVT, estrogen treatment options are not possible

## 2021-11-09 ENCOUNTER — TELEPHONE (OUTPATIENT)
Dept: HEMATOLOGY ONCOLOGY | Facility: CLINIC | Age: 46
End: 2021-11-09

## 2021-11-10 ENCOUNTER — HOSPITAL ENCOUNTER (OUTPATIENT)
Dept: VASCULAR ULTRASOUND | Facility: HOSPITAL | Age: 46
Discharge: HOME | End: 2021-11-10
Attending: INTERNAL MEDICINE
Payer: COMMERCIAL

## 2021-11-10 DIAGNOSIS — M79.662 BILATERAL CALF PAIN: ICD-10-CM

## 2021-11-10 DIAGNOSIS — M79.661 BILATERAL CALF PAIN: ICD-10-CM

## 2021-11-10 DIAGNOSIS — Z86.718 HISTORY OF DVT (DEEP VEIN THROMBOSIS): Primary | ICD-10-CM

## 2021-11-10 DIAGNOSIS — O22.30 DVT (DEEP VEIN THROMBOSIS) IN PREGNANCY: ICD-10-CM

## 2021-11-10 DIAGNOSIS — I82.4Z1 ACUTE DEEP VEIN THROMBOSIS (DVT) OF DISTAL VEIN OF RIGHT LOWER EXTREMITY (HCC): ICD-10-CM

## 2021-11-10 DIAGNOSIS — I82.4Z1 ACUTE DEEP VEIN THROMBOSIS (DVT) OF DISTAL VEIN OF RIGHT LOWER EXTREMITY (HCC): Primary | ICD-10-CM

## 2021-11-10 DIAGNOSIS — Z86.718 HISTORY OF DVT (DEEP VEIN THROMBOSIS): ICD-10-CM

## 2021-11-10 PROCEDURE — 93970 EXTREMITY STUDY: CPT | Performed by: SURGERY

## 2021-11-10 PROCEDURE — 93970 EXTREMITY STUDY: CPT

## 2021-11-11 RX ORDER — APIXABAN 5 MG/1
TABLET, FILM COATED ORAL
Qty: 14 TABLET | Refills: 0 | Status: SHIPPED | OUTPATIENT
Start: 2021-11-11 | End: 2022-04-01

## 2022-01-28 ENCOUNTER — TELEPHONE (OUTPATIENT)
Dept: HEMATOLOGY ONCOLOGY | Facility: CLINIC | Age: 47
End: 2022-01-28

## 2022-01-28 NOTE — TELEPHONE ENCOUNTER
Received fax that patient is having an upcoming colonoscopy, surgeon requesting letter to hold blood thinner  Per Dr Flip Conteh patient is to hold Eliquis for 48 hours prior to colonoscopy and can resume the day after  Letter is being written and faxed over by [facility] for the patient reviewing the above instructions for Eliquis hold prior to colonoscopy  John Thompson pt has no follow up apt's with our office, can you please schedule the patient in AN for an office apt  Pt is able to see a physician assistant  Please notify her of apt

## 2022-02-13 ENCOUNTER — NURSE TRIAGE (OUTPATIENT)
Dept: OTHER | Facility: OTHER | Age: 47
End: 2022-02-13

## 2022-02-14 NOTE — TELEPHONE ENCOUNTER
Reason for Disposition   Question about upcoming scheduled test, no triage required and triager able to answer question    Protocols used: INFORMATION ONLY CALL - NO TRIAGE-ADULT-

## 2022-02-14 NOTE — TELEPHONE ENCOUNTER
Patient calling in regarding colonoscopy tomorrow  I looked on appointment desk and did not see one scheduled  Asked patient if it is with Sylvia Barahona which she states it is not but our number was on the form   Patient is going to call provider that ordered test

## 2022-02-14 NOTE — TELEPHONE ENCOUNTER
Regarding: colonoscopy prep issue/vomiting  ----- Message from [facility] sent at 2/13/2022  7:21 PM EST -----  "I am scheduled for a colonoscopy tomorrow and there is a liquid I have to drink and I threw up most of it im pretty sure so I am wondering what I should do next?"

## 2022-02-17 ENCOUNTER — TELEPHONE (OUTPATIENT)
Dept: HEMATOLOGY ONCOLOGY | Facility: CLINIC | Age: 47
End: 2022-02-17

## 2022-04-01 ENCOUNTER — OFFICE VISIT (OUTPATIENT)
Dept: HEMATOLOGY ONCOLOGY | Facility: CLINIC | Age: 47
End: 2022-04-01
Payer: COMMERCIAL

## 2022-04-01 VITALS
HEART RATE: 83 BPM | WEIGHT: 249 LBS | HEIGHT: 67 IN | TEMPERATURE: 97 F | RESPIRATION RATE: 16 BRPM | BODY MASS INDEX: 39.08 KG/M2 | DIASTOLIC BLOOD PRESSURE: 70 MMHG | OXYGEN SATURATION: 96 % | SYSTOLIC BLOOD PRESSURE: 138 MMHG

## 2022-04-01 DIAGNOSIS — O22.30 DVT (DEEP VEIN THROMBOSIS) IN PREGNANCY: ICD-10-CM

## 2022-04-01 DIAGNOSIS — I82.4Z2 ACUTE DEEP VEIN THROMBOSIS (DVT) OF DISTAL VEIN OF LEFT LOWER EXTREMITY (HCC): ICD-10-CM

## 2022-04-01 DIAGNOSIS — D64.89 ANEMIA DUE TO OTHER CAUSE, NOT CLASSIFIED: ICD-10-CM

## 2022-04-01 DIAGNOSIS — I82.4Z1 ACUTE DEEP VEIN THROMBOSIS (DVT) OF DISTAL VEIN OF RIGHT LOWER EXTREMITY (HCC): Primary | ICD-10-CM

## 2022-04-01 DIAGNOSIS — Z79.01 ANTICOAGULANT LONG-TERM USE: ICD-10-CM

## 2022-04-01 PROBLEM — I82.402 ACUTE DEEP VEIN THROMBOSIS (DVT) OF LEFT LOWER EXTREMITY (HCC): Status: ACTIVE | Noted: 2022-04-01

## 2022-04-01 PROCEDURE — 99215 OFFICE O/P EST HI 40 MIN: CPT | Performed by: PHYSICIAN ASSISTANT

## 2022-04-01 NOTE — PROGRESS NOTES
Hematology/Oncology Outpatient Follow- up Note  Antonio Gonzales 56 y o  female MRN: @ Encounter: [id number]        Date:  4/1/2022      Assessment / Plan:    1  History of LLE calf DVT 3/2012 in 1st trimester of pregnancy  Treated with Lovenox and heparin  2  Unprovoked RLE calf DVT 4/2019  Normal thrombosis panel  She was on Eliquis through 8/2021 and transitioned to ASA 81mg  She admits that she was not taking it daily but still remembered to take it a few times per week    3  Evidence of acute vs subacute non-occlusive deep vein thrombosis noted in one of the paired posterior tibial veins at the proximal-mid calf of LLE noted on BLE venous doppler 11/10/21  Given that she has had 3 separate clotting events, indefinite anticoagulation recommended  Will investigate for other less common causes of clot  Regardless, it is not change her management  We discussed the possibility of reducing Eliquis to 2 5 milligrams p o  daily  Follow-up in 6 months with repeat labs  4   Mild anemia hemoglobin 11 3 with MCV 77 12/21;  11 6, MCV 81 4/2019  This is likely iron deficiency secondary to menorrhagia  She states she recently started taking oral iron  Will check iron panel in 6 months time      HPI:  Antonio Gonzales is a 60-year-old female seen by Dr Jael Marcial for initial consultation 5/29/2019 regarding history of recurrent DVT  She had a history of Acute occlusive left calf DVT within the peroneal veins   and posterior tibial veins diagnosed 3/1/2012 at 10 weeks of her 2nd pregnancy  Initiated on lovenox 1mg/kg (100mg) BID 3/2/22  She was switched to heparin 10,000 units 2-3/week at 36 weeks  3/1/2012 -Normal beta 2 glycoprotein and cardiolipin antibodies  No evidence of lupus anticoagulant  Negative prothrombin gene mutation and factor 5 Leiden mutation    Delivered her son 9/16/12 at [facility]  Patient has A+ blood  Presented to T ED 4/13/2019 with 1 week history of right calf pain    Acute occlusive deep vein thrombosis in the paired posterior tibial veins from the proximal to mid calf identified in Right leg  She was initiated on Eliquis  4/22/2019 she presented with short of breath  CT angio was negative for PE  Dyspnea was considered due to asthma exacerbation     No history or extended travel, trauma, surgery  She commutes about 1 hour [address] one way 3 times a week however had been doing this for 3 years now  Patient is a nonsmoker, no history of malignancy or urine fibroid, no chronic inflammation autoimmune disease  No family history of thrombosis  7/6/2019: Thrombosis panel- negative prothrombin mutation, no factor 5 Leiden mutation no lupus anticoagulant detected  Normal beta 2 glycoprotein and cardiolipin antibodies  Normal protein C and S activity,  Anti thrombin 3 activity 90(LLN 92)    7/9/2019: venous doppler Chronic non occlusive deep vein thrombosis in the posterior tibial veins at the proximal calf extending into the proximal calf in the RLE  She was transitioned from Eliquis to aspirin 81 milligrams p o  daily August 2021  She called the office 11/10/2021 regarding bilateral lower calf muscle discomfort  Evidence of acute vs subacute non-occlusive deep vein thrombosis noted in one  of the paired posterior tibial veins at the proximal-mid calf of LLE noted on BLE venous doppler 11/10/21  ASA discontinued  Eliquis restarted  Mammograms 6/ 21 at [facility]:  BI-RADS 1    Colonoscopy 2/2022 at [address] [place] [address] by Dr Karolyn Mendes -  entire colon appeared normal   Five year screening recommended due to family history in first-degree relative  Mother breast and colon cancer, age 64, MGM - breast, father - prostate  Genetic testing discussed per gynecology  Patient deferred  Interval History:       Patient states she feels very well  Maintains an active lifestyle    Was not taking aspirin as regularly as she should prior to her November 2021 blood clot             Test Results:        Labs:   Lab Results   Component Value Date    HGB 11 4 (L) 07/24/2021    HCT 36 9 07/24/2021    MCV 83 07/24/2021     07/24/2021    WBC 6 14 07/24/2021    NRBC 0 07/24/2021     Lab Results   Component Value Date    K 3 4 (L) 04/22/2019     04/22/2019    CO2 25 04/22/2019    BUN 14 04/22/2019    CREATININE 0 96 04/22/2019    CALCIUM 8 8 04/22/2019    AST 14 04/22/2019    ALT 18 04/22/2019    ALKPHOS 47 04/22/2019    EGFR 73 04/22/2019       Imaging: No results found  ROS:  As mentioned in HPI & Interval History otherwise 14 point ROS negative  Allergies: No Known Allergies  Current Medications: Reviewed  PMH/FH/SH:  Reviewed      Physical Exam:    There is no height or weight on file to calculate BSA  Ht Readings from Last 3 Encounters:   08/04/21 5' 7" (1 702 m)   12/21/20 5' 7" (1 702 m)   07/16/19 5' 8" (1 727 m)        Wt Readings from Last 3 Encounters:   08/04/21 112 kg (248 lb)   12/21/20 115 kg (253 lb)   08/04/20 112 kg (247 lb 8 oz)        Temp Readings from Last 3 Encounters:   08/04/21 97 5 °F (36 4 °C)   07/16/19 98 1 °F (36 7 °C) (Tympanic)   05/29/19 98 4 °F (36 9 °C) (Oral)        BP Readings from Last 3 Encounters:   08/04/21 118/82   12/21/20 116/80   08/04/20 128/88           Physical Exam  Constitutional:       Appearance: She is well-developed  HENT:      Head: Normocephalic and atraumatic  Cardiovascular:      Rate and Rhythm: Normal rate  Pulmonary:      Effort: Pulmonary effort is normal  No respiratory distress  Skin:     General: Skin is warm and dry  Neurological:      Mental Status: She is alert     Psychiatric:         Behavior: Behavior normal            Emergency Contacts:    Extended Emergency Contact Information  Primary Emergency Contact: [facility] Phone: [phone]  Relation: Spouse

## 2022-10-12 ENCOUNTER — APPOINTMENT (OUTPATIENT)
Dept: LAB | Facility: AMBULARY SURGERY CENTER | Age: 47
End: 2022-10-12
Payer: COMMERCIAL

## 2022-10-12 DIAGNOSIS — I82.4Z1 ACUTE DEEP VEIN THROMBOSIS (DVT) OF DISTAL VEIN OF RIGHT LOWER EXTREMITY (HCC): ICD-10-CM

## 2022-10-12 DIAGNOSIS — D64.89 ANEMIA DUE TO OTHER CAUSE, NOT CLASSIFIED: ICD-10-CM

## 2022-10-12 LAB
ALBUMIN SERPL BCP-MCNC: 3.4 G/DL (ref 3.5–5)
ALP SERPL-CCNC: 48 U/L (ref 46–116)
ALT SERPL W P-5'-P-CCNC: 20 U/L (ref 12–78)
ANION GAP SERPL CALCULATED.3IONS-SCNC: 5 MMOL/L (ref 4–13)
AST SERPL W P-5'-P-CCNC: 11 U/L (ref 5–45)
BASOPHILS # BLD AUTO: 0.05 THOUSANDS/ΜL (ref 0–0.1)
BASOPHILS NFR BLD AUTO: 1 % (ref 0–1)
BILIRUB SERPL-MCNC: 0.43 MG/DL (ref 0.2–1)
BUN SERPL-MCNC: 13 MG/DL (ref 5–25)
CALCIUM ALBUM COR SERPL-MCNC: 9.3 MG/DL (ref 8.3–10.1)
CALCIUM SERPL-MCNC: 8.8 MG/DL (ref 8.3–10.1)
CHLORIDE SERPL-SCNC: 107 MMOL/L (ref 96–108)
CO2 SERPL-SCNC: 24 MMOL/L (ref 21–32)
CREAT SERPL-MCNC: 0.82 MG/DL (ref 0.6–1.3)
CRP SERPL QL: 4.6 MG/L
D DIMER PPP FEU-MCNC: 0.35 UG/ML FEU
EOSINOPHIL # BLD AUTO: 0.29 THOUSAND/ΜL (ref 0–0.61)
EOSINOPHIL NFR BLD AUTO: 5 % (ref 0–6)
ERYTHROCYTE [DISTWIDTH] IN BLOOD BY AUTOMATED COUNT: 13.1 % (ref 11.6–15.1)
FERRITIN SERPL-MCNC: 9 NG/ML (ref 8–388)
GFR SERPL CREATININE-BSD FRML MDRD: 86 ML/MIN/1.73SQ M
GLUCOSE P FAST SERPL-MCNC: 103 MG/DL (ref 65–99)
HCT VFR BLD AUTO: 37.8 % (ref 34.8–46.1)
HCYS SERPL-SCNC: 6.5 UMOL/L (ref 3.7–11.2)
HGB BLD-MCNC: 11.8 G/DL (ref 11.5–15.4)
IMM GRANULOCYTES # BLD AUTO: 0.02 THOUSAND/UL (ref 0–0.2)
IMM GRANULOCYTES NFR BLD AUTO: 0 % (ref 0–2)
IRON SATN MFR SERPL: 29 % (ref 15–50)
IRON SERPL-MCNC: 125 UG/DL (ref 50–170)
LYMPHOCYTES # BLD AUTO: 2 THOUSANDS/ΜL (ref 0.6–4.47)
LYMPHOCYTES NFR BLD AUTO: 31 % (ref 14–44)
MCH RBC QN AUTO: 26.5 PG (ref 26.8–34.3)
MCHC RBC AUTO-ENTMCNC: 31.2 G/DL (ref 31.4–37.4)
MCV RBC AUTO: 85 FL (ref 82–98)
MONOCYTES # BLD AUTO: 0.47 THOUSAND/ΜL (ref 0.17–1.22)
MONOCYTES NFR BLD AUTO: 7 % (ref 4–12)
NEUTROPHILS # BLD AUTO: 3.59 THOUSANDS/ΜL (ref 1.85–7.62)
NEUTS SEG NFR BLD AUTO: 56 % (ref 43–75)
NRBC BLD AUTO-RTO: 0 /100 WBCS
PLATELET # BLD AUTO: 252 THOUSANDS/UL (ref 149–390)
PMV BLD AUTO: 8.8 FL (ref 8.9–12.7)
POTASSIUM SERPL-SCNC: 4 MMOL/L (ref 3.5–5.3)
PROT SERPL-MCNC: 7.4 G/DL (ref 6.4–8.4)
RBC # BLD AUTO: 4.45 MILLION/UL (ref 3.81–5.12)
SODIUM SERPL-SCNC: 136 MMOL/L (ref 135–147)
TIBC SERPL-MCNC: 425 UG/DL (ref 250–450)
WBC # BLD AUTO: 6.42 THOUSAND/UL (ref 4.31–10.16)

## 2022-10-12 PROCEDURE — 85379 FIBRIN DEGRADATION QUANT: CPT

## 2022-10-12 PROCEDURE — 80053 COMPREHEN METABOLIC PANEL: CPT

## 2022-10-12 PROCEDURE — 85245 CLOT FACTOR VIII VW RISTOCTN: CPT

## 2022-10-12 PROCEDURE — 82728 ASSAY OF FERRITIN: CPT

## 2022-10-12 PROCEDURE — 83090 ASSAY OF HOMOCYSTEINE: CPT

## 2022-10-12 PROCEDURE — 85240 CLOT FACTOR VIII AHG 1 STAGE: CPT

## 2022-10-12 PROCEDURE — 83540 ASSAY OF IRON: CPT

## 2022-10-12 PROCEDURE — 86140 C-REACTIVE PROTEIN: CPT

## 2022-10-12 PROCEDURE — 85025 COMPLETE CBC W/AUTO DIFF WBC: CPT

## 2022-10-12 PROCEDURE — 83550 IRON BINDING TEST: CPT

## 2022-10-12 PROCEDURE — 36415 COLL VENOUS BLD VENIPUNCTURE: CPT

## 2022-10-14 LAB
FACT XIIIA PPP-ACNC: 124 % (ref 56–140)
VWF:RCO ACT/NOR PPP PL AGG: 104 % (ref 50–200)

## 2022-10-17 ENCOUNTER — OFFICE VISIT (OUTPATIENT)
Dept: HEMATOLOGY ONCOLOGY | Facility: CLINIC | Age: 47
End: 2022-10-17
Payer: COMMERCIAL

## 2022-10-17 VITALS
SYSTOLIC BLOOD PRESSURE: 130 MMHG | DIASTOLIC BLOOD PRESSURE: 82 MMHG | BODY MASS INDEX: 40.02 KG/M2 | WEIGHT: 255 LBS | OXYGEN SATURATION: 99 % | RESPIRATION RATE: 16 BRPM | HEIGHT: 67 IN | TEMPERATURE: 97 F | HEART RATE: 98 BPM

## 2022-10-17 DIAGNOSIS — Z79.01 ANTICOAGULANT LONG-TERM USE: Primary | ICD-10-CM

## 2022-10-17 DIAGNOSIS — I82.4Z2 ACUTE DEEP VEIN THROMBOSIS (DVT) OF DISTAL VEIN OF LEFT LOWER EXTREMITY (HCC): ICD-10-CM

## 2022-10-17 DIAGNOSIS — D64.89 ANEMIA DUE TO OTHER CAUSE, NOT CLASSIFIED: ICD-10-CM

## 2022-10-17 PROCEDURE — 99214 OFFICE O/P EST MOD 30 MIN: CPT | Performed by: PHYSICIAN ASSISTANT

## 2022-10-17 NOTE — PROGRESS NOTES
Hematology/Oncology Outpatient Follow- up Note  Fernando Reveles 56 y o  female MRN: @ Encounter: [id number]        Date:  10/17/2022      Assessment / Plan:    1  History of LLE calf DVT 3/2012 in 1st trimester of pregnancy  Treated with Lovenox and heparin  2  Unprovoked RLE calf DVT 4/2019  Normal thrombosis panel  She was on Eliquis through 8/2021 and transitioned to ASA 81mg  She admits that she was not taking it daily but still remembered to take it a few times per week     3  Evidence of acute vs subacute non-occlusive deep vein thrombosis noted in one of the paired posterior tibial veins at the proximal-mid calf of LLE noted on BLE venous doppler 11/10/21  Given that she has had 3 separate clotting events, indefinite anticoagulation recommended  D-dimer normal       We discussed the possibility of reducing Eliquis to 2 5 milligrams p o  daily  She is comfortable continuing with 5mg po bid  She does occasionally forget doses  She has not had any increased bleeding  4  Mild anemia  Hemoglobin 11 3 with MCV 77 12/21; 11 6, MCV 81 4/2019  This is likely iron deficiency secondary to menorrhagia  Colonoscopy 2/2022 at [address] by Dr Merly Roland - entire colon appeared normal  Five year screening recommended due to family history in first-degree relative  She started taking oral iron early 2022  Iron saturation 29%  Ferritin 9  9/22  She ran out of oral iron has not been taking it for many months  She states she will get a new bottle  We discussed alternative of IV iron though she wishes to continue with oral iron  She will discuss having  her family doctor rx Eliquis at her next office visit  F/U prn in our office  HPI: Fernando Reveles is a 79-year-old female seen by Dr Stephan Babb for initial consultation 5/29/2019 regarding history of recurrent DVT           She had a history of Acute occlusive left calf DVT within the peroneal veins   and posterior tibial veins diagnosed 3/1/2012 at 10 weeks of her 2nd pregnancy  Initiated on lovenox 1mg/kg (100mg) BID 3/2/22  She was switched to heparin 10,000 units 2-3/week at 36 weeks  3/1/2012 -Normal beta 2 glycoprotein and cardiolipin antibodies  No evidence of lupus anticoagulant Negative prothrombin gene mutation and factor 5 Leiden mutation     Delivered her son 9/16/12 at [address]  Patient has A+ blood  Presented to T ED 4/13/2019 with 1 week history of right calf pain  Acute occlusive deep vein thrombosis in the paired posterior tibial veins from the proximal to mid calf identified in Right leg  She was initiated on Eliquis  4/22/2019 she presented with short of breath  CT angio was negative for PE  Dyspnea was considered due to asthma exacerbation     No history or extended travel, trauma, surgery  She commutes about 1 hour drive one way 3 times a week however had been doing this for many years  Patient is a nonsmoker, no history of malignancy or urine fibroid, no chronic inflammation autoimmune disease  No family history of thrombosis  7/6/2019: Thrombosis panel- negative prothrombin mutation, no factor 5 Leiden mutation no lupus anticoagulant detected  Normal beta 2 glycoprotein and cardiolipin antibodies  Normal protein C and S activity,  Anti thrombin 3 activity 90(LLN 92)     7/9/2019: venous doppler Chronic non occlusive deep vein thrombosis in the posterior tibial veins at the proximal calf extending into the proximal calf in the RLE  She was transitioned from Eliquis to aspirin 81 milligrams p o  daily August 2021  She called the office 11/10/2021 regarding bilateral lower calf muscle discomfort  Evidence of acute vs subacute non-occlusive deep vein thrombosis noted in one of the paired posterior tibial veins at the proximal-mid calf of LLE noted on BLE venous doppler 11/10/21  he was not taking aspirin as regularly as she should prior to her November 2021 blood clot  ASA discontinued   Eliquis restarted  Colonoscopy 2/2022 at [address] Dr Jhon Glaser - entire colon appeared normal  Five year screening recommended due to family history in first-degree relative  Mother breast and colon cancer, age 64, MGM - breast, father - prostate  Genetic testing discussed per gynecology  Patient deferred  Interval History    7/12/22 - Mammogram at [facility]- BI-RADS Category 1:  There is no mammographic evidence of malignancy  Routine follow-up mammogram in 1 year is recommended  10/12/22- normal factor 8 level, D-dimer, homocysteine  CRP mildly elevated at 4 6  Iron saturation 29%  Ferritin 9  CMP showed mild elevation of glucose  Hemoglobin 11 8, MCV 85, white blood cell count 6 42, normal differential        Test Results:        Labs:   Lab Results   Component Value Date    HGB 11 8 10/12/2022    HCT 37 8 10/12/2022    MCV 85 10/12/2022     10/12/2022    WBC 6 42 10/12/2022    NRBC 0 10/12/2022     Lab Results   Component Value Date    K 4 0 10/12/2022     10/12/2022    CO2 24 10/12/2022    BUN 13 10/12/2022    CREATININE 0 82 10/12/2022    GLUF 103 (H) 10/12/2022    CALCIUM 8 8 10/12/2022    CORRECTEDCA 9 3 10/12/2022    AST 11 10/12/2022    ALT 20 10/12/2022    ALKPHOS 48 10/12/2022    EGFR 86 10/12/2022       Imaging: No results found  ROS:  As mentioned in HPI & Interval History otherwise 14 point ROS negative  Allergies: No Known Allergies  Current Medications: Reviewed  PMH/FH/SH:  Reviewed      Physical Exam:    There is no height or weight on file to calculate BSA      Ht Readings from Last 3 Encounters:   04/01/22 5' 7" (1 702 m)   08/04/21 5' 7" (1 702 m)   12/21/20 5' 7" (1 702 m)        Wt Readings from Last 3 Encounters:   04/01/22 113 kg (249 lb)   08/04/21 112 kg (248 lb)   12/21/20 115 kg (253 lb)        Temp Readings from Last 3 Encounters:   04/01/22 (!) 97 °F (36 1 °C) (Temporal)   08/04/21 97 5 °F (36 4 °C)   07/16/19 98 1 °F (36 7 °C) (Tympanic)        BP Readings from Last 3 Encounters:   04/01/22 138/70   08/04/21 118/82   12/21/20 116/80           Physical Exam  Constitutional:       Appearance: She is well-developed  HENT:      Head: Normocephalic and atraumatic  Cardiovascular:      Rate and Rhythm: Normal rate  Pulmonary:      Effort: Pulmonary effort is normal  No respiratory distress  Skin:     General: Skin is warm and dry  Neurological:      Mental Status: She is alert     Psychiatric:         Behavior: Behavior normal            Emergency Contacts:    Extended Emergency Contact Information  Primary Emergency Contact: [facility] Phone: [phone]  Relation: Spouse

## 2022-12-20 NOTE — PROGRESS NOTES
Assessment/Plan:    Pt given information on Addyi and Vyleesi and will RTO to discuss HSDD  Pt advised to monitor LLQ discomfort and call the office if it persists/worsens  ED parameters given  Advised monthly SBE, annual CBE and  continued annual mammography  Reviewed ASCCP guidelines and recommended pap with cotesting q3 yrs for this low risk patient  No pap done  Diet/activity recommendations - Calcium 1200 mg daily and Vit D 600-100 IU daily  Perimenopause reviewed  Pt advised to continue to monitor menses  AUB parameters given  RTO in one year or sooner PRN  Diagnoses and all orders for this visit:    Encounter for gynecological examination (general) (routine) with abnormal findings    Screening mammogram for breast cancer  -     Mammo screening bilateral w 3d & cad; Future    Colon cancer screening  -     Cancel: Ambulatory Referral to Gastroenterology; Future    Secondary oligomenorrhea  -     POCT urine HCG        Subjective:      Patient ID: Gian Sherwood is a 56 y o  female  This patient presents for routine annual gyn exam    Menses are becoming irregular lasting about 7 days  Missed period this month  LMP 11/19/22,  has had a vasectomy  UPT neg  Denies AUB  Pt has concerns regarding low libido  She and her  have a good relationship, but she does not have the desire to initiate sex  She also reports intermittent LLQ discomfort  Denies sx at present  She tried to determine if it was related to ovulation and is unsure  She is unsure if it's related to bowel or bladder  She denies dyspareunia, breast concerns, abnormal discharge, bowel/bladder dysfunction, depression/anxiety  Pap normal 12/21/20  Last Mammography normal, 7/12/22   and monogamous  She denies STI concerns   had vasectomy  Mother breast and colon cancer, age 64, MGM - breast, father - prostate  Genetic testing discussed  Patient is unsure if she would like to proceed     Colonoscopy normal 1/2022 per pt  The following portions of the patient's history were reviewed and updated as appropriate: allergies, current medications, past family history, past medical history, past social history, past surgical history and problem list     Review of Systems   Constitutional: Negative  HENT: Negative  Respiratory: Negative  Cardiovascular: Negative  Gastrointestinal: Negative  Endocrine: Negative  Genitourinary: Positive for menstrual problem and pelvic pain  Negative for difficulty urinating, dyspareunia, dysuria, frequency, urgency, vaginal bleeding, vaginal discharge and vaginal pain  Musculoskeletal: Negative  Skin: Negative  Neurological: Negative  Psychiatric/Behavioral: Negative  Objective:      /84   Pulse 99   Ht 5' 7" (1 702 m)   Wt 118 kg (259 lb 12 8 oz)   LMP 11/19/2022   BMI 40 69 kg/m²          Physical Exam  Vitals and nursing note reviewed  Exam conducted with a chaperone present  Constitutional:       Appearance: Normal appearance  She is well-developed  HENT:      Head: Normocephalic and atraumatic  Neck:      Thyroid: No thyroid mass or thyromegaly  Cardiovascular:      Rate and Rhythm: Normal rate and regular rhythm  Heart sounds: Normal heart sounds  Pulmonary:      Effort: Pulmonary effort is normal       Breath sounds: Normal breath sounds  Chest:   Breasts:     Breasts are symmetrical       Right: No inverted nipple, mass, nipple discharge, skin change or tenderness  Left: No inverted nipple, mass, nipple discharge, skin change or tenderness  Abdominal:      General: Bowel sounds are normal       Palpations: Abdomen is soft  Tenderness: There is no abdominal tenderness  Hernia: There is no hernia in the left inguinal area or right inguinal area  Genitourinary:     General: Normal vulva  Exam position: Supine  Pubic Area: No rash         Labia:         Right: No rash, tenderness, lesion or injury  Left: No rash, tenderness, lesion or injury  Urethra: No prolapse, urethral pain, urethral swelling or urethral lesion  Vagina: Normal  No signs of injury and foreign body  No vaginal discharge, erythema, tenderness, bleeding, lesions or prolapsed vaginal walls  Cervix: No cervical motion tenderness, discharge, friability, lesion, erythema, cervical bleeding or eversion  Uterus: Not deviated, not enlarged, not fixed, not tender and no uterine prolapse  Adnexa:         Right: No mass, tenderness or fullness  Left: No mass, tenderness or fullness  Rectum: No external hemorrhoid  Comments: Urethra normal without lesions  No bladder tenderness  Musculoskeletal:         General: Normal range of motion  Cervical back: Normal range of motion and neck supple  Lymphadenopathy:      Lower Body: No right inguinal adenopathy  No left inguinal adenopathy  Skin:     General: Skin is warm and dry  Neurological:      Mental Status: She is alert and oriented to person, place, and time  Psychiatric:         Speech: Speech normal          Behavior: Behavior normal  Behavior is cooperative

## 2022-12-27 ENCOUNTER — ANNUAL EXAM (OUTPATIENT)
Dept: GYNECOLOGY | Facility: CLINIC | Age: 47
End: 2022-12-27

## 2022-12-27 VITALS
HEART RATE: 99 BPM | BODY MASS INDEX: 40.78 KG/M2 | SYSTOLIC BLOOD PRESSURE: 120 MMHG | WEIGHT: 259.8 LBS | HEIGHT: 67 IN | DIASTOLIC BLOOD PRESSURE: 84 MMHG

## 2022-12-27 DIAGNOSIS — Z01.411 ENCOUNTER FOR GYNECOLOGICAL EXAMINATION (GENERAL) (ROUTINE) WITH ABNORMAL FINDINGS: Primary | ICD-10-CM

## 2022-12-27 DIAGNOSIS — Z12.31 SCREENING MAMMOGRAM FOR BREAST CANCER: ICD-10-CM

## 2022-12-27 DIAGNOSIS — Z12.11 COLON CANCER SCREENING: ICD-10-CM

## 2022-12-27 DIAGNOSIS — N91.4 SECONDARY OLIGOMENORRHEA: ICD-10-CM

## 2022-12-27 LAB — SL AMB POCT URINE HCG: NORMAL

## 2023-01-04 DIAGNOSIS — I82.4Z1 ACUTE DEEP VEIN THROMBOSIS (DVT) OF DISTAL VEIN OF RIGHT LOWER EXTREMITY (HCC): ICD-10-CM

## 2023-01-04 RX ORDER — APIXABAN 5 MG/1
TABLET, FILM COATED ORAL
Qty: 90 TABLET | Refills: 2 | Status: SHIPPED | OUTPATIENT
Start: 2023-01-04

## 2023-02-15 NOTE — PROGRESS NOTES
Assessment/Plan:    Will trial Addyi  Risks with ETOH reviewed as well as instructions when drinking which pt was familiar with from the research she had done  Wait 2 hours after consuming 1-2 standard drinks, and skip dose entirely with any more to drink  Info provided on [address]  Pt advised to call when she takes 1st dose as she should schedule a 3 month follow up  Information provided on [employer] for genetic testing  Diagnoses and all orders for this visit:    Low libido  -     Flibanserin (Addyi) 100 MG TABS; Take 1 tablet (100 mg total) by mouth daily at bedtime    Hypoactive sexual desire disorder  -     Flibanserin (Addyi) 100 MG TABS; Take 1 tablet (100 mg total) by mouth daily at bedtime        Subjective:      Patient ID: Nikunj Whitten is a 53 y o  female  The patient presents to discuss HSDD  She was last seen for annual exam on 12/27/22 and given information of Addyi and Vyleesi  Today, she has decided to proceed with Addyi   She also had complaints of LLQ discomfort at that time and was advised to monitor  Pt states that pain has resolved  Pt also has a family hx of colon, breast and prostate cancer  Genetic testing discussed at last visit  Pt was unsure if she wanted to proceed  She is asking for additional info today  No other gyn concerns  The following portions of the patient's history were reviewed and updated as appropriate: allergies, current medications, past family history, past medical history, past social history, past surgical history and problem list     Review of Systems   Constitutional: Negative  HENT: Negative  Respiratory: Negative  Cardiovascular: Negative  Gastrointestinal: Negative  Endocrine: Negative  Genitourinary: Negative for difficulty urinating, dyspareunia, dysuria, frequency, menstrual problem, pelvic pain, urgency, vaginal bleeding, vaginal discharge and vaginal pain  Musculoskeletal: Negative  Skin: Negative      Neurological: Negative  Psychiatric/Behavioral: Negative  Objective:      /70   Pulse 82   LMP 02/02/2023          Physical Exam  Vitals and nursing note reviewed  Constitutional:       Appearance: Normal appearance  HENT:      Head: Normocephalic and atraumatic  Pulmonary:      Effort: Pulmonary effort is normal    Musculoskeletal:         General: Normal range of motion  Cervical back: Normal range of motion  Skin:     General: Skin is warm and dry  Neurological:      Mental Status: She is alert and oriented to person, place, and time  Psychiatric:         Mood and Affect: Mood normal          Behavior: Behavior normal          Thought Content:  Thought content normal          Judgment: Judgment normal

## 2023-02-20 ENCOUNTER — OFFICE VISIT (OUTPATIENT)
Dept: GYNECOLOGY | Facility: CLINIC | Age: 48
End: 2023-02-20

## 2023-02-20 ENCOUNTER — APPOINTMENT (OUTPATIENT)
Dept: LAB | Facility: AMBULARY SURGERY CENTER | Age: 48
End: 2023-02-20

## 2023-02-20 VITALS — SYSTOLIC BLOOD PRESSURE: 112 MMHG | DIASTOLIC BLOOD PRESSURE: 70 MMHG | HEART RATE: 82 BPM

## 2023-02-20 DIAGNOSIS — F52.0 HYPOACTIVE SEXUAL DESIRE DISORDER: ICD-10-CM

## 2023-02-20 DIAGNOSIS — R73.01 IMPAIRED FASTING GLUCOSE: ICD-10-CM

## 2023-02-20 DIAGNOSIS — R68.82 LOW LIBIDO: Primary | ICD-10-CM

## 2023-02-20 LAB
ALBUMIN SERPL BCP-MCNC: 3.5 G/DL (ref 3.5–5)
ALP SERPL-CCNC: 49 U/L (ref 46–116)
ALT SERPL W P-5'-P-CCNC: 21 U/L (ref 12–78)
ANION GAP SERPL CALCULATED.3IONS-SCNC: 5 MMOL/L (ref 4–13)
AST SERPL W P-5'-P-CCNC: 11 U/L (ref 5–45)
BILIRUB SERPL-MCNC: 0.52 MG/DL (ref 0.2–1)
BUN SERPL-MCNC: 13 MG/DL (ref 5–25)
CALCIUM SERPL-MCNC: 8.8 MG/DL (ref 8.3–10.1)
CHLORIDE SERPL-SCNC: 109 MMOL/L (ref 96–108)
CHOLEST SERPL-MCNC: 169 MG/DL
CO2 SERPL-SCNC: 23 MMOL/L (ref 21–32)
CREAT SERPL-MCNC: 0.77 MG/DL (ref 0.6–1.3)
GFR SERPL CREATININE-BSD FRML MDRD: 92 ML/MIN/1.73SQ M
GLUCOSE P FAST SERPL-MCNC: 97 MG/DL (ref 65–99)
HDLC SERPL-MCNC: 37 MG/DL
LDLC SERPL CALC-MCNC: 107 MG/DL (ref 0–100)
POTASSIUM SERPL-SCNC: 4 MMOL/L (ref 3.5–5.3)
PROT SERPL-MCNC: 7.5 G/DL (ref 6.4–8.4)
SODIUM SERPL-SCNC: 137 MMOL/L (ref 135–147)
TRIGL SERPL-MCNC: 125 MG/DL
TSH SERPL DL<=0.05 MIU/L-ACNC: 0.84 UIU/ML (ref 0.45–4.5)

## 2023-02-20 RX ORDER — FLIBANSERIN 100 MG/1
100 TABLET, FILM COATED ORAL
Qty: 30 TABLET | Refills: 2 | Status: SHIPPED | OUTPATIENT
Start: 2023-02-20

## 2023-02-22 LAB
EST. AVERAGE GLUCOSE BLD GHB EST-MCNC: 103 MG/DL
HBA1C MFR BLD: 5.2 %

## 2023-04-07 ENCOUNTER — APPOINTMENT (OUTPATIENT)
Dept: LAB | Facility: AMBULARY SURGERY CENTER | Age: 48
End: 2023-04-07

## 2023-04-07 DIAGNOSIS — E66.01 MORBID OBESITY (HCC): ICD-10-CM

## 2023-04-07 DIAGNOSIS — E61.1 IRON DEFICIENCY: ICD-10-CM

## 2023-04-07 LAB
25(OH)D3 SERPL-MCNC: 17.7 NG/ML (ref 30–100)
BASOPHILS # BLD AUTO: 0.05 THOUSANDS/ÂΜL (ref 0–0.1)
BASOPHILS NFR BLD AUTO: 1 % (ref 0–1)
EOSINOPHIL # BLD AUTO: 0.38 THOUSAND/ÂΜL (ref 0–0.61)
EOSINOPHIL NFR BLD AUTO: 6 % (ref 0–6)
ERYTHROCYTE [DISTWIDTH] IN BLOOD BY AUTOMATED COUNT: 14 % (ref 11.6–15.1)
FERRITIN SERPL-MCNC: 7 NG/ML (ref 8–388)
FOLATE SERPL-MCNC: 11.5 NG/ML (ref 3.1–17.5)
HCT VFR BLD AUTO: 38.7 % (ref 34.8–46.1)
HGB BLD-MCNC: 12 G/DL (ref 11.5–15.4)
IMM GRANULOCYTES # BLD AUTO: 0.02 THOUSAND/UL (ref 0–0.2)
IMM GRANULOCYTES NFR BLD AUTO: 0 % (ref 0–2)
LYMPHOCYTES # BLD AUTO: 1.83 THOUSANDS/ÂΜL (ref 0.6–4.47)
LYMPHOCYTES NFR BLD AUTO: 30 % (ref 14–44)
MCH RBC QN AUTO: 26.1 PG (ref 26.8–34.3)
MCHC RBC AUTO-ENTMCNC: 31 G/DL (ref 31.4–37.4)
MCV RBC AUTO: 84 FL (ref 82–98)
MONOCYTES # BLD AUTO: 0.4 THOUSAND/ÂΜL (ref 0.17–1.22)
MONOCYTES NFR BLD AUTO: 7 % (ref 4–12)
NEUTROPHILS # BLD AUTO: 3.49 THOUSANDS/ÂΜL (ref 1.85–7.62)
NEUTS SEG NFR BLD AUTO: 56 % (ref 43–75)
NRBC BLD AUTO-RTO: 0 /100 WBCS
PLATELET # BLD AUTO: 243 THOUSANDS/UL (ref 149–390)
PMV BLD AUTO: 9.2 FL (ref 8.9–12.7)
RBC # BLD AUTO: 4.59 MILLION/UL (ref 3.81–5.12)
VIT B12 SERPL-MCNC: 401 PG/ML (ref 100–900)
WBC # BLD AUTO: 6.17 THOUSAND/UL (ref 4.31–10.16)

## 2023-05-24 ENCOUNTER — APPOINTMENT (OUTPATIENT)
Dept: LAB | Facility: AMBULARY SURGERY CENTER | Age: 48
End: 2023-05-24

## 2023-05-24 DIAGNOSIS — R79.0 CALCIUM BLOOD DECREASED: ICD-10-CM

## 2023-05-24 LAB
ERYTHROCYTE [DISTWIDTH] IN BLOOD BY AUTOMATED COUNT: 14.5 % (ref 11.6–15.1)
FERRITIN SERPL-MCNC: 9 NG/ML (ref 11–307)
HCT VFR BLD AUTO: 36.4 % (ref 34.8–46.1)
HGB BLD-MCNC: 11.7 G/DL (ref 11.5–15.4)
IRON SATN MFR SERPL: 14 % (ref 15–50)
IRON SERPL-MCNC: 53 UG/DL (ref 50–170)
MCH RBC QN AUTO: 27.5 PG (ref 26.8–34.3)
MCHC RBC AUTO-ENTMCNC: 32.1 G/DL (ref 31.4–37.4)
MCV RBC AUTO: 86 FL (ref 82–98)
PLATELET # BLD AUTO: 229 THOUSANDS/UL (ref 149–390)
PMV BLD AUTO: 8.7 FL (ref 8.9–12.7)
RBC # BLD AUTO: 4.25 MILLION/UL (ref 3.81–5.12)
TIBC SERPL-MCNC: 380 UG/DL (ref 250–450)
WBC # BLD AUTO: 5.64 THOUSAND/UL (ref 4.31–10.16)

## 2023-05-31 NOTE — PROGRESS NOTES
Assessment/Plan:    Pt advised that if she has not had improvement, she can stop  Pt states she still has 2 more bottles so she will probably continue until they are completed  She will call if she would like to continue  Diagnoses and all orders for this visit:    Low libido    Other orders  -     cholecalciferol (VITAMIN D3) 400 units tablet; Take 400 Units by mouth daily  -     ferrous sulfate 325 (65 Fe) mg tablet; Take 325 mg by mouth daily with breakfast        Subjective:      Patient ID: Anuj Leiva is a 53 y o  female  Pt presents for follow up  She was started on Addyi for HSDD 2/2023  She has been on for about 8 weeks and has not noticed a difference  She states she normally does not take it on the weekends because of alcohol consumption  No other gyn concerns  The following portions of the patient's history were reviewed and updated as appropriate: allergies, current medications, past family history, past medical history, past social history, past surgical history and problem list     Review of Systems   Constitutional: Negative  HENT: Negative  Respiratory: Negative  Cardiovascular: Negative  Gastrointestinal: Negative  Endocrine: Negative  Genitourinary: Negative for difficulty urinating, dyspareunia, dysuria, frequency, menstrual problem, pelvic pain, urgency, vaginal bleeding, vaginal discharge and vaginal pain  Musculoskeletal: Negative  Skin: Negative  Neurological: Negative  Psychiatric/Behavioral: Negative  Objective:      /84          Physical Exam  Constitutional:       Appearance: Normal appearance  HENT:      Head: Normocephalic and atraumatic  Pulmonary:      Effort: Pulmonary effort is normal       Breath sounds: Normal breath sounds  Musculoskeletal:         General: Normal range of motion  Cervical back: Normal range of motion  Skin:     General: Skin is warm and dry     Neurological:      Mental Status: She is alert and oriented to person, place, and time  Psychiatric:         Mood and Affect: Mood normal          Behavior: Behavior normal          Thought Content:  Thought content normal          Judgment: Judgment normal

## 2023-06-02 ENCOUNTER — OFFICE VISIT (OUTPATIENT)
Dept: GYNECOLOGY | Facility: CLINIC | Age: 48
End: 2023-06-02

## 2023-06-02 VITALS — DIASTOLIC BLOOD PRESSURE: 84 MMHG | SYSTOLIC BLOOD PRESSURE: 132 MMHG

## 2023-06-02 DIAGNOSIS — R68.82 LOW LIBIDO: Primary | ICD-10-CM

## 2023-06-02 RX ORDER — OMEGA-3S/DHA/EPA/FISH OIL/D3 300MG-1000
400 CAPSULE ORAL DAILY
COMMUNITY

## 2023-06-02 RX ORDER — FERROUS SULFATE 325(65) MG
325 TABLET ORAL
COMMUNITY

## 2023-07-26 ENCOUNTER — TELEPHONE (OUTPATIENT)
Dept: GYNECOLOGY | Facility: CLINIC | Age: 48
End: 2023-07-26

## 2023-08-16 ENCOUNTER — APPOINTMENT (OUTPATIENT)
Dept: LAB | Facility: AMBULARY SURGERY CENTER | Age: 48
End: 2023-08-16
Payer: COMMERCIAL

## 2023-08-16 DIAGNOSIS — Z01.818 OTHER SPECIFIED PRE-OPERATIVE EXAMINATION: ICD-10-CM

## 2023-08-16 DIAGNOSIS — E66.01 MORBID OBESITY (HCC): ICD-10-CM

## 2023-08-16 LAB
ALBUMIN SERPL BCP-MCNC: 3.5 G/DL (ref 3.5–5)
ALP SERPL-CCNC: 47 U/L (ref 46–116)
ALT SERPL W P-5'-P-CCNC: 18 U/L (ref 12–78)
ANION GAP SERPL CALCULATED.3IONS-SCNC: 3 MMOL/L
AST SERPL W P-5'-P-CCNC: 8 U/L (ref 5–45)
BASOPHILS # BLD AUTO: 0.06 THOUSANDS/ÂΜL (ref 0–0.1)
BASOPHILS NFR BLD AUTO: 1 % (ref 0–1)
BILIRUB SERPL-MCNC: 0.34 MG/DL (ref 0.2–1)
BUN SERPL-MCNC: 14 MG/DL (ref 5–25)
CALCIUM SERPL-MCNC: 9.1 MG/DL (ref 8.3–10.1)
CHLORIDE SERPL-SCNC: 111 MMOL/L (ref 96–108)
CO2 SERPL-SCNC: 25 MMOL/L (ref 21–32)
CREAT SERPL-MCNC: 0.78 MG/DL (ref 0.6–1.3)
EOSINOPHIL # BLD AUTO: 0.39 THOUSAND/ÂΜL (ref 0–0.61)
EOSINOPHIL NFR BLD AUTO: 6 % (ref 0–6)
ERYTHROCYTE [DISTWIDTH] IN BLOOD BY AUTOMATED COUNT: 13.2 % (ref 11.6–15.1)
GFR SERPL CREATININE-BSD FRML MDRD: 90 ML/MIN/1.73SQ M
GLUCOSE P FAST SERPL-MCNC: 115 MG/DL (ref 65–99)
HCT VFR BLD AUTO: 36.1 % (ref 34.8–46.1)
HGB BLD-MCNC: 11.9 G/DL (ref 11.5–15.4)
IMM GRANULOCYTES # BLD AUTO: 0.01 THOUSAND/UL (ref 0–0.2)
IMM GRANULOCYTES NFR BLD AUTO: 0 % (ref 0–2)
LYMPHOCYTES # BLD AUTO: 1.99 THOUSANDS/ÂΜL (ref 0.6–4.47)
LYMPHOCYTES NFR BLD AUTO: 29 % (ref 14–44)
MCH RBC QN AUTO: 28.2 PG (ref 26.8–34.3)
MCHC RBC AUTO-ENTMCNC: 33 G/DL (ref 31.4–37.4)
MCV RBC AUTO: 86 FL (ref 82–98)
MONOCYTES # BLD AUTO: 0.45 THOUSAND/ÂΜL (ref 0.17–1.22)
MONOCYTES NFR BLD AUTO: 7 % (ref 4–12)
NEUTROPHILS # BLD AUTO: 3.88 THOUSANDS/ÂΜL (ref 1.85–7.62)
NEUTS SEG NFR BLD AUTO: 57 % (ref 43–75)
NRBC BLD AUTO-RTO: 0 /100 WBCS
PLATELET # BLD AUTO: 249 THOUSANDS/UL (ref 149–390)
PMV BLD AUTO: 8.7 FL (ref 8.9–12.7)
POTASSIUM SERPL-SCNC: 4.2 MMOL/L (ref 3.5–5.3)
PROT SERPL-MCNC: 7.5 G/DL (ref 6.4–8.4)
RBC # BLD AUTO: 4.22 MILLION/UL (ref 3.81–5.12)
SODIUM SERPL-SCNC: 139 MMOL/L (ref 135–147)
WBC # BLD AUTO: 6.78 THOUSAND/UL (ref 4.31–10.16)

## 2023-08-16 PROCEDURE — 85025 COMPLETE CBC W/AUTO DIFF WBC: CPT

## 2023-08-16 PROCEDURE — 80053 COMPREHEN METABOLIC PANEL: CPT

## 2023-08-16 PROCEDURE — 36415 COLL VENOUS BLD VENIPUNCTURE: CPT

## 2023-08-23 ENCOUNTER — OFFICE VISIT (OUTPATIENT)
Dept: GYNECOLOGY | Facility: CLINIC | Age: 48
End: 2023-08-23
Payer: COMMERCIAL

## 2023-08-23 VITALS — SYSTOLIC BLOOD PRESSURE: 150 MMHG | BODY MASS INDEX: 40.57 KG/M2 | DIASTOLIC BLOOD PRESSURE: 90 MMHG | WEIGHT: 259 LBS

## 2023-08-23 DIAGNOSIS — N93.9 ABNORMAL UTERINE BLEEDING (AUB): Primary | ICD-10-CM

## 2023-08-23 PROCEDURE — 99213 OFFICE O/P EST LOW 20 MIN: CPT | Performed by: OBSTETRICS & GYNECOLOGY

## 2023-08-24 NOTE — PROGRESS NOTES
Assessment/Plan:    Will schedule TVS/SIS/EMB. Discussed both hormonal and surgical option for heavy bleeding. Pt most interested in [address] Insurance info given. Pt will continue care with surgeon and hematologist.     Diagnoses and all orders for this visit:    Abnormal uterine bleeding (AUB)        Subjective:      Patient ID: Marko Bess is a 53 y.o. female. Pt presents to discuss heavy menses. She has a ferritin level of 5 despite consistent use of oral iron with vitamin C. She is scheduled for venofer infusion. H/H 12/36. She is scheduled for gastric bypass surgery and is considering postponing surgery. She also has hx of DVT x 3, one with pregnancy, other two unrelated to surgery, pregnancy. She is on Elliquis. In regard to menses, she reports 1st day is light, days 2 and 3 are heavy with clotting requiring super plus tampon every 2 hours. Days 4-6 lighter flow. She has had a couple of longer intervals between menses. She denies pain with menses. LMP 7/30/23.  has had a vasectomy. The following portions of the patient's history were reviewed and updated as appropriate: allergies, current medications, past family history, past medical history, past social history, past surgical history and problem list.    Review of Systems   Constitutional: Negative. HENT: Negative. Respiratory: Negative. Cardiovascular: Negative. Gastrointestinal: Negative. Endocrine: Negative. Genitourinary: Positive for menstrual problem. Negative for difficulty urinating, dyspareunia, dysuria, frequency, pelvic pain, urgency, vaginal bleeding, vaginal discharge and vaginal pain. Musculoskeletal: Negative. Skin: Negative. Neurological: Negative. Psychiatric/Behavioral: Negative. Objective:      /90   Wt 117 kg (259 lb)   LMP 07/30/2023 (Exact Date)   BMI 40.57 kg/m²          Physical Exam  Vitals and nursing note reviewed. Exam conducted with a chaperone present. Constitutional:       Appearance: Normal appearance. HENT:      Head: Normocephalic and atraumatic. Pulmonary:      Effort: Pulmonary effort is normal.   Abdominal:      Hernia: There is no hernia in the left inguinal area or right inguinal area. Genitourinary:     General: Normal vulva. Exam position: Supine. Pubic Area: No rash. Labia:         Right: No rash, tenderness, lesion or injury. Left: No rash, tenderness, lesion or injury. Urethra: No urethral pain, urethral swelling or urethral lesion. Vagina: No signs of injury and foreign body. No vaginal discharge, erythema, tenderness, bleeding, lesions or prolapsed vaginal walls. Cervix: No cervical motion tenderness, discharge, friability, lesion, erythema or eversion. Uterus: Not deviated, not enlarged, not fixed and not tender. Comments: Exam limited by body habitus  Musculoskeletal:         General: Normal range of motion. Cervical back: Normal range of motion. Lymphadenopathy:      Lower Body: No right inguinal adenopathy. No left inguinal adenopathy. Skin:     General: Skin is warm and dry. Neurological:      Mental Status: She is alert and oriented to person, place, and time. Psychiatric:         Mood and Affect: Mood normal.         Behavior: Behavior normal.         Thought Content:  Thought content normal.         Judgment: Judgment normal.

## 2023-09-19 NOTE — PROGRESS NOTES
AMB US Pelvic Non OB    Date/Time: 9/20/2023 2:00 PM    Performed by: Marian Kim  Authorized by: Shay Ayala DO  Universal Protocol:  Consent given by: patient  Patient identity confirmed: verbally with patient      Procedure details:     Indications: non-obstetric vaginal bleeding      Technique:  Transvaginal US, Non-OB    Position: lithotomy exam    Uterine findings:     Length (cm): 11.12    Height (cm):  5.6    Width (cm):  7.72    Endometrial stripe: identified      Endometrium thickness (mm):  14.17  Left ovary findings:     Left ovary:  Visualized    Length (cm): 6.49    Height (cm): 6.06    Width (cm): 6.09  Right ovary findings:     Right ovary:  Visualized    Length (cm): 3.05    Height (cm): 1.46    Width (cm): 1.8  Other findings:     Free pelvic fluid: not identified      Free peritoneal fluid: not identified    Post-Procedure Details:     Impression:  Anteverted uterus and right ovary appear within normal limits. The left ovary demonstrates a 5.7cm simple cyst. No free fluid. Tolerance: Tolerated well, no immediate complications    Complications: no complications    Additional Procedure Comments:      GE Voluson P8 transvaginal transducer RIC5-RA with Serial Number [id number] was used during procedure and subsequently cleaned with high level disinfection utilizing the [employer].      Ultrasound performed at:     [facility] [address]  [address] [place] [address], [address] [place] [address]  Phone: [phone]  Fax:  [phone]    Sonohysterogram    Date/Time: 9/20/2023 2:00 PM    Performed by: Shay Ayala DO  Authorized by: Shay Ayala DO  Universal Protocol:  Consent given by: patient  Patient identity confirmed: verbally with patient      Pre-procedure:     Prepped with: Betadine    Procedure:     Cervix cleaned and prepped: yes      Uterus sounded: yes      Catheter inserted: yes      Uterine cavity distended with saline: yes    Post-procedure:     Patient observed: yes      Post procedure instructions given to patient: yes      Patient tolerated procedure well with no complications: yes    Comments:      Sonohysterogram demonstrates a smooth appearing endometrium.    Endometrial biopsy    Date/Time: 9/20/2023 2:00 PM    Performed by: Ruben Trujillo DO  Authorized by: Ruben Trujillo DO  Universal Protocol:  Consent given by: patient  Patient identity confirmed: verbally with patient      Procedure:     Procedure: endometrial biopsy with Pipelle      A bivalve speculum was placed in the vagina: yes      Specimen collected: specimen collected and sent to pathology      Patient tolerated procedure well with no complications: yes impaired

## 2023-09-20 ENCOUNTER — ULTRASOUND (OUTPATIENT)
Dept: GYNECOLOGY | Facility: CLINIC | Age: 48
End: 2023-09-20

## 2023-09-20 ENCOUNTER — OFFICE VISIT (OUTPATIENT)
Dept: GYNECOLOGY | Facility: CLINIC | Age: 48
End: 2023-09-20

## 2023-09-20 DIAGNOSIS — N93.9 ABNORMAL UTERINE BLEEDING (AUB): Primary | ICD-10-CM

## 2023-09-20 DIAGNOSIS — N83.202 CYST OF LEFT OVARY: ICD-10-CM

## 2023-09-20 DIAGNOSIS — O22.30 DVT (DEEP VEIN THROMBOSIS) IN PREGNANCY: ICD-10-CM

## 2023-09-20 DIAGNOSIS — Z11.3 SCREENING FOR STDS (SEXUALLY TRANSMITTED DISEASES): ICD-10-CM

## 2023-09-20 PROCEDURE — 88305 TISSUE EXAM BY PATHOLOGIST: CPT | Performed by: PATHOLOGY

## 2023-09-20 NOTE — PROGRESS NOTES
Assessment/Plan:         Diagnoses and all orders for this visit:    Abnormal uterine bleeding (AUB); options discussed with patient including following versus medical management versus endometrial ablation. With her history of DVT options are somewhat limited. We discussed progesterone only pill or Mirena. Patient has opted to proceed with Mirena. Cyst of left ovary; repeat ultrasound in 1 to 2 months    DVT (deep vein thrombosis) in pregnancy        Subjective:      Patient ID: Lorenzo Mcintosh is a 53 y.o. female. HPI patient was seen in the office on August 23. At that time she was complaining of heavy manses. She had a ferritin level of 5 despite consistent use of oral iron with vitamin C. She had a hemoglobin of 12. She did have a Venofer infusion. Of note is that the patient has a history of DVT x3. She is presently on Eliquis.   She was vies to return to the office for TVS SIS possible biopsy    Procedure Orders   Endometrial biopsy [[id number]] ordered by Francisco Castaneda [[id number]] ordered by Dona Waite Pelvic Non OB [[id number]] ordered by Danika Johnson     Post-procedure Diagnoses   Abnormal uterine bleeding (AUB) [N93.9]          Cosign Needed           AMB US Pelvic Non OB     Date/Time: 9/20/2023 2:00 PM     Performed by: Danika Johnson  Authorized by: Gilberto Urbano DO  Universal Protocol:  Consent given by: patient  Patient identity confirmed: verbally with patient        Procedure details:     Indications: non-obstetric vaginal bleeding      Technique:  Transvaginal US, Non-OB    Position: lithotomy exam    Uterine findings:     Length (cm): 11.12    Height (cm):  5.6    Width (cm):  7.72    Endometrial stripe: identified      Endometrium thickness (mm):  14.17  Left ovary findings:     Left ovary:  Visualized    Length (cm): 6.49    Height (cm): 6.06    Width (cm): 6.09  Right ovary findings:     Right ovary:  Visualized    Length (cm): 3.05 Height (cm): 1.46    Width (cm): 1.8  Other findings:     Free pelvic fluid: not identified      Free peritoneal fluid: not identified    Post-Procedure Details:     Impression:  Anteverted uterus and right ovary appear within normal limits. The left ovary demonstrates a 5.7cm simple cyst. No free fluid. Tolerance: Tolerated well, no immediate complications    Complications: no complications    Additional Procedure Comments:      GE Voluson P8 transvaginal transducer RIC5-RA with Serial Number [id number] was used during procedure and subsequently cleaned with high level disinfection utilizing the [facility] [employer].      Ultrasound performed at:   [address] [facility] [address] [place] Benítez, [facility]  Phone: [phone]  Fax:  [phone]     Sonohysterogram     Date/Time: 9/20/2023 2:00 PM     Performed by: Matheus Joya DO  Authorized by: Matheus Joya DO  Universal Protocol:  Consent given by: patient  Patient identity confirmed: verbally with patient        Pre-procedure:     Prepped with: Betadine    Procedure:     Cervix cleaned and prepped: yes      Uterus sounded: yes      Catheter inserted: yes      Uterine cavity distended with saline: yes    Post-procedure:     Patient observed: yes      Post procedure instructions given to patient: yes      Patient tolerated procedure well with no complications: yes    Comments:      Sonohysterogram demonstrates a smooth appearing endometrium.    Endometrial biopsy     Date/Time: 9/20/2023 2:00 PM     Performed by: Matheus Joya DO  Authorized by: Matheus Joya DO  Universal Protocol:  Consent given by: patient  Patient identity confirmed: verbally with patient        Procedure:     Procedure: endometrial biopsy with Pipelle      A bivalve speculum was placed in the vagina: yes      Specimen collected: specimen collected and sent to pathology      Patient tolerated procedure well with no complications: yes                       Electronically signed by Jeffery Segura at 2023  2:12 PM      The following portions of the patient's history were reviewed and updated as appropriate:   She  has a past medical history of DVT (deep venous thrombosis) ([address]). She   Patient Active Problem List    Diagnosis Date Noted   • Acute deep vein thrombosis (DVT) of left lower extremity ([address]) 2022   • Other specified anemias 2022   • DVT (deep vein thrombosis) in pregnancy 2019   • Deep vein thrombosis (DVT) of right lower extremity (HCC) 2019   • Anticoagulant long-term use 2019   • Eczema 2014   • Diabetes mellitus affecting pregnancy, childbirth, or puerperium 2014   • Family history of malignant neoplasm of breast 2010     She  has a past surgical history that includes  section and Ovarian cyst removal.  Her family history includes Breast cancer in her maternal grandmother; Breast cancer (age of onset: 48) in her mother; Colon cancer (age of onset: 64) in her mother; Dementia in her paternal grandmother; Heart attack (age of onset: 47) in her paternal grandfather; Prostate cancer (age of onset: 76) in her father. She  reports that she has never smoked. She has never used smokeless tobacco. She reports current alcohol use. She reports that she does not use drugs. Current Outpatient Medications   Medication Sig Dispense Refill   • Addyi 100 MG TABS TAKE 1 TABLET (100 MG TOTAL) BY MOUTH DAILY AT BEDTIME 30 tablet 0   • albuterol (PROVENTIL HFA,VENTOLIN HFA) 90 mcg/act inhaler .  RESCUE INHALER: 1-2inhalations every 4hrs as needed for cough, wheezing or shortness of breath     • cholecalciferol (VITAMIN D3) 400 units tablet Take 400 Units by mouth daily     • Eliquis 5 MG take 1 tablet by mouth twice a day 90 tablet 2   • ferrous sulfate 325 (65 Fe) mg tablet Take 325 mg by mouth daily with breakfast       No current facility-administered medications for this visit. Current Outpatient Medications on File Prior to Visit   Medication Sig   • Addyi 100 MG TABS TAKE 1 TABLET (100 MG TOTAL) BY MOUTH DAILY AT BEDTIME   • albuterol (PROVENTIL HFA,VENTOLIN HFA) 90 mcg/act inhaler . RESCUE INHALER: 1-2inhalations every 4hrs as needed for cough, wheezing or shortness of breath   • cholecalciferol (VITAMIN D3) 400 units tablet Take 400 Units by mouth daily   • Eliquis 5 MG take 1 tablet by mouth twice a day   • ferrous sulfate 325 (65 Fe) mg tablet Take 325 mg by mouth daily with breakfast     No current facility-administered medications on file prior to visit. She has No Known Allergies. .    Review of Systems      Objective:      LMP 07/30/2023 (Exact Date)          Physical Exam

## 2023-09-25 PROCEDURE — 88305 TISSUE EXAM BY PATHOLOGIST: CPT | Performed by: PATHOLOGY

## 2023-11-08 ENCOUNTER — TELEPHONE (OUTPATIENT)
Dept: GYNECOLOGY | Facility: CLINIC | Age: 48
End: 2023-11-08

## 2023-11-08 ENCOUNTER — OFFICE VISIT (OUTPATIENT)
Dept: GYNECOLOGY | Facility: CLINIC | Age: 48
End: 2023-11-08

## 2023-11-08 ENCOUNTER — ULTRASOUND (OUTPATIENT)
Dept: GYNECOLOGY | Facility: CLINIC | Age: 48
End: 2023-11-08

## 2023-11-08 DIAGNOSIS — Z30.430 ENCOUNTER FOR IUD INSERTION: ICD-10-CM

## 2023-11-08 DIAGNOSIS — Z30.430 ENCOUNTER FOR IUD INSERTION: Primary | ICD-10-CM

## 2023-11-08 DIAGNOSIS — Z11.3 SCREENING EXAMINATION FOR STD (SEXUALLY TRANSMITTED DISEASE): ICD-10-CM

## 2023-11-08 DIAGNOSIS — N83.202 CYST OF LEFT OVARY: Primary | ICD-10-CM

## 2023-11-08 DIAGNOSIS — N83.201 RIGHT OVARIAN CYST: Primary | ICD-10-CM

## 2023-11-08 RX ORDER — MISOPROSTOL 200 UG/1
200 TABLET ORAL ONCE
Qty: 1 TABLET | Refills: 0 | Status: SHIPPED | OUTPATIENT
Start: 2023-11-08 | End: 2023-11-08

## 2023-11-08 NOTE — PROGRESS NOTES
AMB US Pelvic Non OB    Date/Time: 11/8/2023 1:00 PM    Performed by: Gutierrez   Authorized by: Kerry Baker DO  Universal Protocol:  Patient identity confirmed: verbally with patient    Procedure details:     Indications: ovarian cysts      Technique:  Transvaginal US, Non-OB    Position: supine exam    Uterine findings:     Length (cm): 10.96    Height (cm):  5.81    Width (cm):  7.72    Endometrial stripe: identified      Endometrium thickness (mm):  15.6  Left ovary findings:     Left ovary:  Visualized    Length (cm): 5.09    Height (cm): 3.52    Width (cm): 3.73  Right ovary findings:     Right ovary:  Visualized    Length (cm): 3.33    Height (cm): 2.67    Width (cm): 2.77  Other findings:     Free pelvic fluid: not identified      Free peritoneal fluid: not identified    Post-Procedure Details:     Impression:  Anteverted uterus and right ovary appear within normal limits. Nabothian cysts are present in the cervix. The left ovary demonstrates a 3.3cm simple cyst (previously 5.7cm), smaller than previously noted. No free fluid. Tolerance: Tolerated well, no immediate complications    Complications: no complications    Additional Procedure Comments:      GE Voluson P8 transvaginal transducer RIC5-RA with Serial Number [id number] was used during procedure and subsequently cleaned with high level disinfection utilizing the [employer].      Ultrasound performed at:     [facility] for [address]  [address] [place] [address], [address] [place] [address]  Phone: [phone]  Fax:  [phone]

## 2023-11-08 NOTE — PROGRESS NOTES
Patient had been seen in August complaining of heavy manses. She had a hemoglobin of 12. She did have Venofer infusions. Of note is that the patient has a history of DVT x3. Presently on Eliquis. At that time a work-up was performed secondary to the abnormal uterine bleeding which is now under control. Ultrasound at that time revealed an anteverted uterus and right ovary within normal limits. Left ovary demonstrated a 5.7 cm simple cyst.  Endometrial biopsy was benign. She was vies to return to the office in 2 to 3 months for repeat ultrasound       Date/Time: 11/8/2023 1:00 PM     Performed by: Brandon Sheets  Authorized by: Inocencio Jaimes DO  Universal Protocol:  Patient identity confirmed: verbally with patient     Procedure details:     Indications: ovarian cysts      Technique:  Transvaginal US, Non-OB    Position: supine exam    Uterine findings:     Length (cm): 10.96    Height (cm):  5.81    Width (cm):  7.72    Endometrial stripe: identified      Endometrium thickness (mm):  15.6  Left ovary findings:     Left ovary:  Visualized    Length (cm): 5.09    Height (cm): 3.52    Width (cm): 3.73  Right ovary findings:     Right ovary:  Visualized    Length (cm): 3.33    Height (cm): 2.67    Width (cm): 2.77  Other findings:     Free pelvic fluid: not identified      Free peritoneal fluid: not identified    Post-Procedure Details:     Impression:  Anteverted uterus and right ovary appear within normal limits. Nabothian cysts are present in the cervix. The left ovary demonstrates a 3.3cm simple cyst (previously 5.7cm), smaller than previously noted. No free fluid. Tolerance: Tolerated well, no immediate complications    Complications: no complications          Impression stable to slightly smaller left ovarian cyst.  Since this is simple in appearance and is diminished in size I reassured the patient that this is most assuredly benign.   Since she is asymptomatic the plan is to follow

## 2023-11-08 NOTE — TELEPHONE ENCOUNTER
Please send cytotec to [facility] in [place]. Patient states insurance will cover Mirena. Please order for her.

## 2023-11-13 ENCOUNTER — TELEPHONE (OUTPATIENT)
Dept: GYNECOLOGY | Facility: CLINIC | Age: 48
End: 2023-11-13

## 2023-11-13 NOTE — TELEPHONE ENCOUNTER
Pt scheduled for IUD insertion.  She is aware of all instructions and will have labs done prior to appointment

## 2023-11-28 ENCOUNTER — APPOINTMENT (OUTPATIENT)
Dept: LAB | Facility: AMBULARY SURGERY CENTER | Age: 48
End: 2023-11-28
Payer: COMMERCIAL

## 2023-11-28 DIAGNOSIS — Z11.3 SCREENING EXAMINATION FOR STD (SEXUALLY TRANSMITTED DISEASE): ICD-10-CM

## 2023-11-28 DIAGNOSIS — Z30.430 ENCOUNTER FOR IUD INSERTION: ICD-10-CM

## 2023-11-29 LAB
C TRACH DNA SPEC QL NAA+PROBE: NEGATIVE
N GONORRHOEA DNA SPEC QL NAA+PROBE: NEGATIVE

## 2023-12-01 ENCOUNTER — PROCEDURE VISIT (OUTPATIENT)
Dept: GYNECOLOGY | Facility: CLINIC | Age: 48
End: 2023-12-01
Payer: COMMERCIAL

## 2023-12-01 VITALS
WEIGHT: 259 LBS | SYSTOLIC BLOOD PRESSURE: 118 MMHG | DIASTOLIC BLOOD PRESSURE: 70 MMHG | HEIGHT: 67 IN | BODY MASS INDEX: 40.65 KG/M2

## 2023-12-01 DIAGNOSIS — N93.9 ABNORMAL UTERINE BLEEDING (AUB): ICD-10-CM

## 2023-12-01 DIAGNOSIS — Z01.818 PREPROCEDURAL EXAMINATION: Primary | ICD-10-CM

## 2023-12-01 LAB — SL AMB POCT URINE HCG: NORMAL

## 2023-12-01 PROCEDURE — 58300 INSERT INTRAUTERINE DEVICE: CPT | Performed by: OBSTETRICS & GYNECOLOGY

## 2023-12-01 PROCEDURE — 81025 URINE PREGNANCY TEST: CPT | Performed by: OBSTETRICS & GYNECOLOGY

## 2023-12-01 NOTE — PROGRESS NOTES
Iud insertions    Date/Time: 12/1/2023 3:00 PM    Performed by: HASMUKH Chaney  Authorized by: HASMUKH Chaney    Other Assisting Provider: No    Verbal consent obtained?: Yes    Written consent obtained?: Yes    Risks and benefits: Risks, benefits and alternatives were discussed (infection, bleeding, pain, injury to surrounding tissue, uterine perforation)    Time Out:     Time out performed at:  12/1/2023 3:04 PM  Patient states understanding of procedure being performed: Yes    Patient's understanding of procedure matches consent: Yes    Procedure consent matches procedure scheduled: Yes    Relevant documents present and verified: Yes    Test results available and properly labeled: Yes    Required items: Required blood products, implants, devices and special equipment available    Patient identity confirmed:  Verbally with patient  Procedure:     Pelvic exam performed: yes      Negative GC/chlamydia test: yes      Negative urine pregnancy test: yes      Cervix cleaned and prepped: yes      Speculum placed in vagina: yes      Tenaculum applied to cervix: yes      Uterus sounded: yes      Uterus sound depth (cm):  10    IUD inserted with no complications: yes      IUD type:  Mirena    Strings trimmed: yes (2 cms)    Comments:      Pt discharged in stable condition. Will RTO for string check. Post procedure instructions reviewed.

## 2023-12-15 ENCOUNTER — TELEPHONE (OUTPATIENT)
Dept: GYNECOLOGY | Facility: CLINIC | Age: 48
End: 2023-12-15

## 2023-12-15 ENCOUNTER — NURSE TRIAGE (OUTPATIENT)
Dept: OTHER | Facility: OTHER | Age: 48
End: 2023-12-15

## 2023-12-15 DIAGNOSIS — N92.1 MENORRHAGIA WITH IRREGULAR CYCLE: Primary | ICD-10-CM

## 2023-12-15 RX ORDER — MEDROXYPROGESTERONE ACETATE 10 MG/1
10 TABLET ORAL DAILY
Qty: 10 TABLET | Refills: 0 | Status: SHIPPED | OUTPATIENT
Start: 2023-12-15 | End: 2023-12-25

## 2023-12-15 NOTE — TELEPHONE ENCOUNTER
----- Message from Leonora Cueva sent at 12/15/2023 12:19 PM EST -----  Regarding: FW: Mirena Implant  Contact: [phone]    ----- Message -----  From: Misty Mccray  Sent: 12/15/2023  11:51 AM EST  To: [facility]  Subject: Mirena Implant                                   Carmine Brooks,    I wanted to reach out regarding my bleeding. I am still bleeding and have been bleeding for over a month and a half now. Some days it’s very light and other days, like today, it’s quite heavy. I was hoping the IUD would stop this bleeding or at least stop it for a little while but that has not been the case yet. I think you mentioned a pill that would basically “clean me out”. What are your thoughts on this or is there something else that needs to be done?    Thanks so much!    Misty Mccray

## 2023-12-16 NOTE — TELEPHONE ENCOUNTER
Regarding: Temperature 96.8  ----- Message from Eric Pizarro sent at 12/15/2023  8:53 PM EST -----  " I have Covid and I have been bleeding for about 1 month and a half now.  My body temperature 96.8, should I be concerned?"

## 2023-12-16 NOTE — TELEPHONE ENCOUNTER
Reason for Disposition  • MODERATE vaginal bleeding (e.g., soaking 1 pad or tampon per hour and present > 6 hours; 1 menstrual cup every 6 hours)    Answer Assessment - Initial Assessment Questions  1. AMOUNT: "Describe the bleeding that you are having."     - SPOTTING: spotting, or pinkish / brownish mucous discharge; does not fill panti-liner or pad     - MILD:  less than 1 pad / hour; less than patient's usual menstrual bleeding    - MODERATE: 1-2 pads / hour; 1 menstrual cup every 6 hours; small-medium blood clots (e.g., pea, grape, small coin)    - SEVERE: soaking 2 or more pads/hour for 2 or more hours; 1 menstrual cup every 2 hours; bleeding not contained by pads or continuous red blood from vagina; large blood clots (e.g., golf ball, large coin)       Pad and tampon every 3 hours     2. ONSET: "When did the bleeding begin?" "Is it continuing now?"      1 1/2 months     3. MENSTRUAL PERIOD: "When was the last normal menstrual period?" "How is this different than your period?"      9/15    4. REGULARITY: "How regular are your periods?"      Not regular     5. ABDOMINAL PAIN: "Do you have any pain?" "How bad is the pain?"  (e.g., Scale 1-10; mild, moderate, or severe)    - MILD (1-3): doesn't interfere with normal activities, abdomen soft and not tender to touch     - MODERATE (4-7): interferes with normal activities or awakens from sleep, tender to touch     - SEVERE (8-10): excruciating pain, doubled over, unable to do any normal activities       Mild abdominal cramping    6. PREGNANCY: "Could you be pregnant?" "Are you sexually active?" "Did you recently give birth?"      N/A    7. BREASTFEEDING: "Are you breastfeeding?"      N/A    8. HORMONES: "Are you taking any hormone medications, prescription or OTC?" (e.g., birth control pills, estrogen)      Prescribed today, but has not picked it up yet     9.  BLOOD THINNERS: "Do you take any blood thinners?" (e.g., Coumadin/warfarin, Pradaxa/dabigatran, aspirin) Eliquis 5 mg bid-but didn't take today    10. CAUSE: "What do you think is causing the bleeding?" (e.g., recent gyn surgery, recent gyn procedure; known bleeding disorder, cervical cancer, polycystic ovarian disease, fibroids)          Unknown     11. HEMODYNAMIC STATUS: "Are you weak or feeling lightheaded?" If Yes, ask: "Can you stand and walk normally?"         Feeling dizzy, weak, temp 96.8 temporal     12.  OTHER SYMPTOMS: "What other symptoms are you having with the bleeding?" (e.g., passed tissue, vaginal discharge, fever, menstrual-type cramps)  Denies    Tested positive for Covid on 12/13/23-body aches, cough, fatigue, headache, fever, chills    Protocols used: Vaginal Bleeding - Abnormal-ADULT-

## 2024-01-02 ENCOUNTER — TELEPHONE (OUTPATIENT)
Dept: GYNECOLOGY | Facility: CLINIC | Age: 49
End: 2024-01-02

## 2024-01-02 NOTE — PROGRESS NOTES
Assessment/Plan:      Discussed option of DMPA x1 until Dr. Quiles returns at which time she will schedule consult for possible ablation. Advised to call when she has the DMPA for appt.   Advised monthly SBE, annual CBE and the importance of continued annual mammography reviewed. Reviewed ASCCP guidelines and recommended pap with cotesting q3 yrs for this low risk patient. Pap done today. STI testing was offered and the patient declines; she reports low risk. Diet/activity recommendations - Calcium 1200 mg daily and Vit D 600-100 IU daily.  RTO in one year or sooner PRN.      Diagnoses and all orders for this visit:    Encounter for gynecological examination (general) (routine) with abnormal findings    Abnormal uterine bleeding (AUB)  -     medroxyPROGESTERone (DEPO-PROVERA) 150 mg/mL injection; Inject 1 mL (150 mg total) into a muscle every 3 (three) months        Subjective:      Patient ID: Misty Mccray is a 49 y.o. female.    This patient presents for routine annual gyn exam.   She has a hx of AUB, Mirena inserted 12/1/23. Hx of DVT X3, on Elliquis. Has hx of low ferritin despite oral iron. Has had Venofer infusions. Last ferritin, H/H normal 12/20/23.  Initial work up for AUB 9/20/23 showed endometrial thickness of 14.17 with benign EMB and a 5.7 simple cyst on left ovary. Repeat ultrasound to evaluate cyst done on 11/8/23 showed decrease in size to 3.3 cms.   Menses now still heavy with bleeding every day since November 3.  She denies pelvic pain, dyspareunia, breast concerns, abnormal discharge, bowel/bladder dysfunction, depression/anxiety.   Pap/HPV testing up to date and normal, 12/21/20. Last Mammography normal, 7/12/22, next ordered. Colonoscopy 2022.   and monogamous. Continues with concerns with low libido. Addyi was ineffective and pt does not wish to try Vyleesi.              The following portions of the patient's history were reviewed and updated as appropriate: allergies, current  "medications, past family history, past medical history, past social history, past surgical history and problem list.    Review of Systems   Constitutional: Negative.    HENT: Negative.     Respiratory: Negative.     Cardiovascular: Negative.    Gastrointestinal: Negative.    Endocrine: Negative.    Genitourinary:  Positive for menstrual problem. Negative for difficulty urinating, dyspareunia, dysuria, frequency, pelvic pain, urgency, vaginal bleeding, vaginal discharge and vaginal pain.   Musculoskeletal: Negative.    Skin: Negative.    Neurological: Negative.    Psychiatric/Behavioral: Negative.           Objective:      /80   Pulse 99   Ht 5' 7\" (1.702 m)   Wt 121 kg (266 lb)   BMI 41.66 kg/m²          Physical Exam  Vitals and nursing note reviewed. Exam conducted with a chaperone present.   Constitutional:       Appearance: Normal appearance. She is well-developed.   HENT:      Head: Normocephalic and atraumatic.   Neck:      Thyroid: No thyroid mass or thyromegaly.   Cardiovascular:      Rate and Rhythm: Normal rate and regular rhythm.      Heart sounds: Normal heart sounds.   Pulmonary:      Effort: Pulmonary effort is normal.      Breath sounds: Normal breath sounds.   Chest:   Breasts:     Breasts are symmetrical.      Right: No inverted nipple, mass, nipple discharge, skin change or tenderness.      Left: No inverted nipple, mass, nipple discharge, skin change or tenderness.   Abdominal:      General: Bowel sounds are normal.      Palpations: Abdomen is soft.      Tenderness: There is no abdominal tenderness.      Hernia: There is no hernia in the left inguinal area or right inguinal area.   Genitourinary:     General: Normal vulva.      Exam position: Supine.      Pubic Area: No rash.       Labia:         Right: No rash, tenderness, lesion or injury.         Left: No rash, tenderness, lesion or injury.       Urethra: No prolapse, urethral pain, urethral swelling or urethral lesion.      Vagina: " No signs of injury and foreign body. Bleeding present. No vaginal discharge, erythema, tenderness, lesions or prolapsed vaginal walls.      Cervix: No cervical motion tenderness, discharge, friability, lesion, erythema or eversion.      Uterus: Not deviated, not enlarged, not fixed, not tender and no uterine prolapse.       Adnexa:         Right: No mass, tenderness or fullness.          Left: No mass, tenderness or fullness.        Rectum: No external hemorrhoid.      Comments: Urethra normal without lesions  No bladder tenderness  IUD strings noted at 2 cms from os  Exam limited by body habitus  Musculoskeletal:         General: Normal range of motion.      Cervical back: Normal range of motion and neck supple.   Lymphadenopathy:      Lower Body: No right inguinal adenopathy. No left inguinal adenopathy.   Skin:     General: Skin is warm and dry.   Neurological:      Mental Status: She is alert and oriented to person, place, and time.   Psychiatric:         Speech: Speech normal.         Behavior: Behavior normal. Behavior is cooperative.

## 2024-01-02 NOTE — TELEPHONE ENCOUNTER
Patient said she got partial voice mail from you today.  Wondering if there is anything more or was this message in error?  Please advise

## 2024-01-03 ENCOUNTER — ANNUAL EXAM (OUTPATIENT)
Dept: OBGYN CLINIC | Facility: CLINIC | Age: 49
End: 2024-01-03

## 2024-01-03 VITALS
WEIGHT: 266 LBS | DIASTOLIC BLOOD PRESSURE: 80 MMHG | HEART RATE: 99 BPM | HEIGHT: 67 IN | SYSTOLIC BLOOD PRESSURE: 122 MMHG | BODY MASS INDEX: 41.75 KG/M2

## 2024-01-03 DIAGNOSIS — Z01.419 ENCOUNTER FOR GYNECOLOGICAL EXAMINATION WITH PAPANICOLAOU SMEAR OF CERVIX: ICD-10-CM

## 2024-01-03 DIAGNOSIS — Z01.411 ENCOUNTER FOR GYNECOLOGICAL EXAMINATION (GENERAL) (ROUTINE) WITH ABNORMAL FINDINGS: Primary | ICD-10-CM

## 2024-01-03 DIAGNOSIS — N93.9 ABNORMAL UTERINE BLEEDING (AUB): ICD-10-CM

## 2024-01-03 PROCEDURE — G0145 SCR C/V CYTO,THINLAYER,RESCR: HCPCS | Performed by: OBSTETRICS & GYNECOLOGY

## 2024-01-03 PROCEDURE — G0476 HPV COMBO ASSAY CA SCREEN: HCPCS | Performed by: OBSTETRICS & GYNECOLOGY

## 2024-01-03 RX ORDER — MEDROXYPROGESTERONE ACETATE 150 MG/ML
150 INJECTION, SUSPENSION INTRAMUSCULAR
Qty: 1 ML | Refills: 0 | Status: SHIPPED | OUTPATIENT
Start: 2024-01-03

## 2024-01-09 LAB
LAB AP GYN PRIMARY INTERPRETATION: NORMAL
Lab: NORMAL

## 2024-01-16 ENCOUNTER — TELEPHONE (OUTPATIENT)
Dept: OBGYN CLINIC | Facility: CLINIC | Age: 49
End: 2024-01-16

## 2024-01-16 NOTE — TELEPHONE ENCOUNTER
Pt called stating that her bleeding has stopped. Pt was going to get a depo injection due to the bleeding. Spoke with Za and she advised pt not to come in if bleeding had stopped. Made pt aware and pt showed understanding. Appointment canceled for 1/16.

## 2024-02-06 ENCOUNTER — CONSULT (OUTPATIENT)
Dept: GYNECOLOGY | Facility: CLINIC | Age: 49
End: 2024-02-06
Payer: COMMERCIAL

## 2024-02-06 VITALS
WEIGHT: 266 LBS | BODY MASS INDEX: 41.75 KG/M2 | HEART RATE: 99 BPM | SYSTOLIC BLOOD PRESSURE: 122 MMHG | HEIGHT: 67 IN | DIASTOLIC BLOOD PRESSURE: 80 MMHG

## 2024-02-06 DIAGNOSIS — N93.9 ABNORMAL UTERINE BLEEDING (AUB): Primary | ICD-10-CM

## 2024-02-06 PROCEDURE — 99212 OFFICE O/P EST SF 10 MIN: CPT | Performed by: OBSTETRICS & GYNECOLOGY

## 2024-02-06 PROCEDURE — 96372 THER/PROPH/DIAG INJ SC/IM: CPT | Performed by: OBSTETRICS & GYNECOLOGY

## 2024-02-06 RX ORDER — MEDROXYPROGESTERONE ACETATE 150 MG/ML
150 INJECTION, SUSPENSION INTRAMUSCULAR ONCE
Status: COMPLETED | OUTPATIENT
Start: 2024-02-06 | End: 2024-02-06

## 2024-02-06 RX ADMIN — MEDROXYPROGESTERONE ACETATE 150 MG: 150 INJECTION, SUSPENSION INTRAMUSCULAR at 09:41

## 2024-02-06 NOTE — PROGRESS NOTES
Patient give Depo Provera today in the LD, lot. [id number], exp. 04/2025. NDC [address]-3. Patient brought her own medcation. Depo given by JENNY Clemons

## 2024-02-06 NOTE — PROGRESS NOTES
Patient had been seen in August complaining of heavy menses.  She had a hemoglobin at that time of 12.  She did have Venofer infusions.  Patient does have a history of DVT x 3.  She is presently on Eliquis.  At that time a workup was performed secondary to the abnormal uterine bleeding.  Ultrasound at that time revealed an anteverted uterus and right ovary within normal limits.  Endometrial biopsy was benign.  She did have a cyst on the left ovary measuring 5.7 cm.  She return to the office for repeat ultrasound on November 8 the ultrasound revealed an anteverted uterus with right ovary appearing normal.  The left ovary demonstrated a 3.3 cm simple cyst smaller than previously noted.  We discussed treatment options for controlling the menorrhagia.  Patient opted proceed with Mirena.  Mirena was inserted on December 1.  Since insertion she has had intermittent episodes of bleeding occasionally heavy.  She was advised to return to the office for Depo shot.  Patient also wanted to discuss possibility of an endometrial ablation.    We did discuss the endometrial ablation.  We also discussed that following Mirena it is not unusual to have irregular bleeding up until 6 months.  My recommendation was to proceed with a shot of Depo-Provera today.  If she continues to have menorrhagia she will then likely proceed with endometrial ablation

## 2024-05-24 ENCOUNTER — NURSE TRIAGE (OUTPATIENT)
Age: 49
End: 2024-05-24

## 2024-05-24 NOTE — TELEPHONE ENCOUNTER
"Patient called stating she is having severe menstrual cramps. Ranks pain #6 but has been as high as #8, taking Tylenol and using heating pad. States she is having heavy VB, passed a clot this AM. Patient has the Mirena IUD and has irregular VB, she notes that she had VB last week as well. Advised patient to take Motrin 600 mg every 6 hours for pain. May take Tylenol up to 3000 mg in 24 hours. Advised to continue using a heating pad, warm shower for comfort. Advised patient if she saturates a pad an hour, passes large clots, feels like passing out, pain is unrelieved with medication to proceed to ER. Appointment scheduled for evaluation in office 5/28/24.       Reason for Disposition   MODERATE pain (e.g., cramps interfere with normal activities) and not relieved by ibuprofen or naproxen used per Care Advice    Answer Assessment - Initial Assessment Questions  1. LOCATION: \"Where does it hurt?\"       Lower pelvis  2. ONSET: \"When did this episode of pain begin?\"        This AM  3. SEVERITY: \"How bad is the pain?\" \"Are you missing school or work because of the pain?\"  (e.g., Scale 1-10; mild, moderate, or severe)    - MILD (1-3): doesn't interfere with normal activities, lasting 1-2 days     - MODERATE (4-7): interferes with normal activities (missing work or school), lasting 2-3 days, some associated GI symptoms     - SEVERE (8-10): excruciating pain, lasting 2-7 days, associated GI symptoms, pain radiating into thighs and back      #6-8  4. VAGINAL BLEEDING: \"Describe the bleeding that you are having.\" \"How much bleeding is there?\"     - SPOTTING: spotting, or pinkish / brownish mucous discharge; does not fill panti-liner or pad     - MILD:  less than 1 pad / hour; less than patient's usual menstrual bleeding    - MODERATE: 1-2 pads / hour; 1 menstrual cup every 6 hours; small-medium blood clots (e.g., pea, grape, small coin)    - SEVERE: soaking 2 or more pads/hour for 2 or more hours; 1 menstrual cup every 2 " "hours; bleeding not contained by pads or continuous red blood from vagina; large blood clots (e.g., golf ball, large coin)       Varies, heavy at times with clots  5. MENSTRUAL HISTORY:  \"When did this menstrual period begin?\", \"Is this a normal period for you?\"        irregular    7. OTHER SYMPTOMS: \"What other symptoms are you having with the pain?\" (e.g., fever, dizzy/lighthead, vomiting, diarrhea, vaginal discharge)      denies  8. PREGNANCY: \"Is there any chance you are pregnant?\" (e.g., unprotected intercourse, missed birth control pill, broken condom)      denies    Protocols used: Abdominal Pain - Menstrual Cramps-ADULT-[place]    "

## 2024-05-28 ENCOUNTER — OFFICE VISIT (OUTPATIENT)
Dept: GYNECOLOGY | Facility: CLINIC | Age: 49
End: 2024-05-28
Payer: COMMERCIAL

## 2024-05-28 VITALS
DIASTOLIC BLOOD PRESSURE: 70 MMHG | BODY MASS INDEX: 40.02 KG/M2 | SYSTOLIC BLOOD PRESSURE: 120 MMHG | HEIGHT: 67 IN | WEIGHT: 255 LBS | HEART RATE: 91 BPM

## 2024-05-28 DIAGNOSIS — R10.2 PELVIC PAIN: Primary | ICD-10-CM

## 2024-05-28 PROCEDURE — 99213 OFFICE O/P EST LOW 20 MIN: CPT | Performed by: OBSTETRICS & GYNECOLOGY

## 2024-05-28 NOTE — PROGRESS NOTES
"Assessment/Plan:    Pt will continue to monitor, taking ibuprofen if bleeding starts again. TVU ordered if pelvic pain occurs again.     Diagnoses and all orders for this visit:    Pelvic pain  -     US pelvis complete w transvaginal; Future        Subjective:      Patient ID: Misty Mccray is a 49 y.o. female.    Pt presents with pelvic pain.   She had Mirena placed 6 months ago in for AUB. She states Friday, 4 days ago, she began bleeding with large clots associated with cramping rated 9/10 for about 2 hours. She took ibuprofen and sx improved. Sx resolved after about 7 hours. No further bleeding or cramping.         The following portions of the patient's history were reviewed and updated as appropriate: allergies, current medications, past family history, past medical history, past social history, past surgical history and problem list.    Review of Systems   Constitutional: Negative.    HENT: Negative.     Respiratory: Negative.     Cardiovascular: Negative.    Gastrointestinal: Negative.    Endocrine: Negative.    Genitourinary:  Positive for menstrual problem. Negative for difficulty urinating, dyspareunia, dysuria, frequency, pelvic pain, urgency, vaginal bleeding, vaginal discharge and vaginal pain.   Musculoskeletal: Negative.    Skin: Negative.    Neurological: Negative.    Psychiatric/Behavioral: Negative.           Objective:      /70   Pulse 91   Ht 5' 7\" (1.702 m)   Wt 116 kg (255 lb)   BMI 39.94 kg/m²          Physical Exam  Vitals and nursing note reviewed.   Constitutional:       Appearance: Normal appearance.   HENT:      Head: Normocephalic and atraumatic.   Pulmonary:      Effort: Pulmonary effort is normal.   Abdominal:      Hernia: There is no hernia in the left inguinal area or right inguinal area.   Genitourinary:     General: Normal vulva.      Exam position: Supine.      Pubic Area: No rash.       Labia:         Right: No rash, tenderness, lesion or injury.         Left: No rash, " tenderness, lesion or injury.       Urethra: No urethral pain, urethral swelling or urethral lesion.      Vagina: No signs of injury and foreign body. No vaginal discharge, erythema, tenderness, bleeding, lesions or prolapsed vaginal walls.      Cervix: No cervical motion tenderness, discharge, friability, lesion, erythema, cervical bleeding or eversion.      Uterus: Not deviated, not enlarged, not fixed, not tender and no uterine prolapse.       Adnexa:         Right: No mass, tenderness or fullness.          Left: No mass, tenderness or fullness.        Comments: IUD strings noted 2 cms from os  Exam limited by body habitus  Musculoskeletal:         General: Normal range of motion.      Cervical back: Normal range of motion.   Lymphadenopathy:      Lower Body: No right inguinal adenopathy. No left inguinal adenopathy.   Skin:     General: Skin is warm and dry.   Neurological:      Mental Status: She is alert and oriented to person, place, and time.   Psychiatric:         Mood and Affect: Mood normal.         Behavior: Behavior normal.         Thought Content: Thought content normal.         Judgment: Judgment normal.